# Patient Record
Sex: FEMALE | Race: WHITE | Employment: OTHER | URBAN - NONMETROPOLITAN AREA
[De-identification: names, ages, dates, MRNs, and addresses within clinical notes are randomized per-mention and may not be internally consistent; named-entity substitution may affect disease eponyms.]

---

## 2017-02-28 ENCOUNTER — HISTORY (OUTPATIENT)
Dept: SURGERY | Facility: OTHER | Age: 72
End: 2017-02-28

## 2017-06-26 ENCOUNTER — HISTORY (OUTPATIENT)
Dept: FAMILY MEDICINE | Facility: OTHER | Age: 72
End: 2017-06-26

## 2017-06-26 ENCOUNTER — OFFICE VISIT - GICH (OUTPATIENT)
Dept: FAMILY MEDICINE | Facility: OTHER | Age: 72
End: 2017-06-26

## 2017-06-26 DIAGNOSIS — M54.2 CERVICALGIA: ICD-10-CM

## 2017-06-26 DIAGNOSIS — S16.1XXA STRAIN OF MUSCLE, FASCIA AND TENDON AT NECK LEVEL, INITIAL ENCOUNTER: ICD-10-CM

## 2017-06-30 ENCOUNTER — HISTORY (OUTPATIENT)
Dept: FAMILY MEDICINE | Facility: OTHER | Age: 72
End: 2017-06-30

## 2017-06-30 ENCOUNTER — OFFICE VISIT - GICH (OUTPATIENT)
Dept: FAMILY MEDICINE | Facility: OTHER | Age: 72
End: 2017-06-30

## 2017-06-30 DIAGNOSIS — E89.0 POSTPROCEDURAL HYPOTHYROIDISM: ICD-10-CM

## 2017-06-30 DIAGNOSIS — Z87.39 PERSONAL HISTORY OF OTHER DISEASES OF THE MUSCULOSKELETAL SYSTEM AND CONNECTIVE TISSUE: ICD-10-CM

## 2017-06-30 DIAGNOSIS — Z23 ENCOUNTER FOR IMMUNIZATION: ICD-10-CM

## 2017-06-30 DIAGNOSIS — R53.83 OTHER FATIGUE: ICD-10-CM

## 2017-06-30 LAB
ABSOLUTE BASOPHILS - HISTORICAL: 0 THOU/CU MM
ABSOLUTE EOSINOPHILS - HISTORICAL: 0.2 THOU/CU MM
ABSOLUTE IMMATURE GRANULOCYTES(METAS,MYELOS,PROS) - HISTORICAL: 0 THOU/CU MM
ABSOLUTE LYMPHOCYTES - HISTORICAL: 2.5 THOU/CU MM (ref 0.9–2.9)
ABSOLUTE MONOCYTES - HISTORICAL: 0.5 THOU/CU MM
ABSOLUTE NEUTROPHILS - HISTORICAL: 4.3 THOU/CU MM (ref 1.7–7)
BASOPHILS # BLD AUTO: 0.4 %
EOSINOPHIL NFR BLD AUTO: 2.4 %
ERYTHROCYTE [DISTWIDTH] IN BLOOD BY AUTOMATED COUNT: 12.4 % (ref 11.5–15.5)
HCT VFR BLD AUTO: 40.7 % (ref 33–51)
HEMOGLOBIN: 13.6 G/DL (ref 12–16)
IMMATURE GRANULOCYTES(METAS,MYELOS,PROS) - HISTORICAL: 0.3 %
LYMPHOCYTES NFR BLD AUTO: 33.3 % (ref 20–44)
MCH RBC QN AUTO: 29.5 PG (ref 26–34)
MCHC RBC AUTO-ENTMCNC: 33.4 G/DL (ref 32–36)
MCV RBC AUTO: 88 FL (ref 80–100)
MONOCYTES NFR BLD AUTO: 6 %
NEUTROPHILS NFR BLD AUTO: 57.6 % (ref 42–72)
PLATELET # BLD AUTO: 202 THOU/CU MM (ref 140–440)
PMV BLD: 11.4 FL (ref 6.5–11)
RED BLOOD COUNT - HISTORICAL: 4.61 MIL/CU MM (ref 4–5.2)
T4 TOTAL: 8.57 UG/DL (ref 6.09–12.23)
TSH - HISTORICAL: 1.4 UIU/ML (ref 0.34–5.6)
WHITE BLOOD COUNT - HISTORICAL: 7.5 THOU/CU MM (ref 4.5–11)

## 2017-07-05 ENCOUNTER — AMBULATORY - GICH (OUTPATIENT)
Dept: SCHEDULING | Facility: OTHER | Age: 72
End: 2017-07-05

## 2017-08-08 ENCOUNTER — HOSPITAL ENCOUNTER (OUTPATIENT)
Dept: RADIOLOGY | Facility: OTHER | Age: 72
End: 2017-08-08
Attending: FAMILY MEDICINE

## 2017-08-08 ENCOUNTER — HISTORY (OUTPATIENT)
Dept: FAMILY MEDICINE | Facility: OTHER | Age: 72
End: 2017-08-08

## 2017-08-08 ENCOUNTER — OFFICE VISIT - GICH (OUTPATIENT)
Dept: FAMILY MEDICINE | Facility: OTHER | Age: 72
End: 2017-08-08

## 2017-08-08 DIAGNOSIS — M25.552 PAIN IN LEFT HIP: ICD-10-CM

## 2017-08-08 DIAGNOSIS — R19.5 OTHER FECAL ABNORMALITIES: ICD-10-CM

## 2017-08-09 ENCOUNTER — AMBULATORY - GICH (OUTPATIENT)
Dept: LAB | Facility: OTHER | Age: 72
End: 2017-08-09

## 2017-08-09 ENCOUNTER — COMMUNICATION - GICH (OUTPATIENT)
Dept: SURGERY | Facility: OTHER | Age: 72
End: 2017-08-09

## 2017-08-09 DIAGNOSIS — R19.5 OTHER FECAL ABNORMALITIES: ICD-10-CM

## 2017-08-10 ENCOUNTER — COMMUNICATION - GICH (OUTPATIENT)
Dept: SURGERY | Facility: OTHER | Age: 72
End: 2017-08-10

## 2017-08-10 DIAGNOSIS — Z12.11 ENCOUNTER FOR SCREENING FOR MALIGNANT NEOPLASM OF COLON: ICD-10-CM

## 2017-08-10 LAB
METHOD O&P - HISTORICAL: NORMAL
OVA/PARASITE EXAM - HISTORICAL: NORMAL

## 2017-08-11 ENCOUNTER — COMMUNICATION - GICH (OUTPATIENT)
Dept: FAMILY MEDICINE | Facility: OTHER | Age: 72
End: 2017-08-11

## 2017-08-29 ENCOUNTER — HOSPITAL ENCOUNTER (OUTPATIENT)
Dept: SURGERY | Facility: OTHER | Age: 72
Discharge: HOME | End: 2017-08-29
Attending: SURGERY | Admitting: SURGERY

## 2017-08-29 ENCOUNTER — SURGERY (OUTPATIENT)
Dept: SURGERY | Facility: OTHER | Age: 72
End: 2017-08-29

## 2017-08-29 ENCOUNTER — TRANSFERRED RECORDS (OUTPATIENT)
Dept: MULTI SPECIALTY CLINIC | Facility: CLINIC | Age: 72
End: 2017-08-29

## 2017-08-29 ENCOUNTER — HISTORY (OUTPATIENT)
Dept: SURGERY | Facility: OTHER | Age: 72
End: 2017-08-29

## 2017-08-29 DIAGNOSIS — R19.5 OTHER FECAL ABNORMALITIES: ICD-10-CM

## 2017-12-27 NOTE — PROGRESS NOTES
Patient Information     Patient Name MRN Sex Fany Trujillo [id number] Female 1945      Progress Notes by Marialuisa Carrizales DO at 2017 12:17 PM     Author:  Marialuisa Carrizales DO Service:  (none) Author Type:  PHYS- Osteopathic     Filed:  2017 12:18 PM Date of Service:  2017 12:17 PM Status:  Signed     :  Marialuisa Carrizales DO (PHYS- Osteopathic)            Colonoscopy REPORT  ?  PRE-OPERATIVE DIAGNOSIS:  Abnormal bowel movement   POST-OPERATIVE DIAGNOSIS: normal colon     PROCEDURES: Colonoscopy + biopsy taken   SURGEON: AFUA Carrizales DO    ESTIMATED BLOOD LOSS: None     COMPLICATIONS: The patient tolerated the procedure well without complication.     INDICATIONS:   Fany Healy  Is seen at the request of their Elizabeth Haider MD, and is here today for a colonoscopy for     ICD-10-CM    1. Abnormal findings in stool R19.5 sodium chloride 0.9% 5 mL syringe (NORMAL SALINE)      lidocaine (1%) injection 0.1-1 mL      lactated Ringers infusion    . Informed consent was obtained, explaining the risks and benefits of the procedure, including but not limited to bleeding, infection, perforation, aspiration, complications from the anesthesia.   ?  DESCRIPTION OF PROCEDURE:   The patient was brought to the endoscopy suite and placed in left lateral decubitus position. Anesthesia was administered per the Department of Anesthesia, with constant monitoring of all vital signs. Digital rectal exam was performed prior to beginning the procedure and revealed no anal or rectal pathology. The previously lubricated Olympus was inserted in the rectum and insufflation was begun. The scope was passed up through the recto-sigmoid valves, through the sigmoid and transverse, down the ascending and the cecum was achieved at 90 cm. Good prep was noted. The scope was then withdrawn. There were no polyps, AVM,s or diverticula noted. Biopsy taken from 80/50/rectum.  The patient tolerated the procedure without  complicated and transferred to the recovery room in stable condition. Colonoscopy should be repeated in 10 years  time.   DO Nika

## 2017-12-27 NOTE — PROGRESS NOTES
Patient Information     Patient Name MRN Fany Reagan [id number] Female 1945      Progress Notes by Elizabeth Haider MD at 2017  3:30 PM     Author:  Elizabeth Haider MD Service:  (none) Author Type:  Physician     Filed:  2017  6:07 PM Encounter Date:  2017 Status:  Signed     :  Elizabeth Haider MD (Physician)            Nursing Notes:   Cynthia Larose  2017  4:29 PM  Signed  Patient presents for follow up rapid clinic Pain in the neck, Shoulders, and back.  Rapid clinic thought this was due to mowing and she has been icing heating and resting and states she is feeling better.   Cynthia Larose LPN........................2017  3:48 PM         Subjective:  Fany Healy is a 71 y.o. female who presents for follow up multiple issues     S/P thyroidectomy  patient was seen at the [facility] this year. Labs at that time to include thyroid testing. She is status post partial thyroidectomy having half of her thyroid removed. She gets a TSH and T4 annually. She notes that she has increased fatigue. She's been doing a lot of work at home.       Grief  States that when she returned home to see her after her second son passed away last year was very difficult for her. Her , and two sons  within 9 years. She states that she felt very depressed in April may but now that the warmer weather has come she likes being outside. She spends 10-50 minutes on a personal information.  She has several friends who support her;  Sister in area.     PHQ Depression Screen  Date of PHQ exam: 17  Over the last 2 weeks, how often have you been bothered by any of the following problems?  1. Little interest or pleasure in doing things: 0 - Not at all  2. Feeling down, depressed, or hopeless: 0 - Not at all                                  History of neck pain  patient reports having a history of upper back bilateral shoulder and neck pain. When she made  the appointment that's with the appointment was for. She was out mowing lawn on the incline. She was seen in the emergency room. She was using ice and heat and Tylenol and symptoms have improved. She is not mowing that portion of her lawn. Wondering what she can do to keep her neck shoulders and upper back in good health. She denies any numbness and tingling into her hands. No changes in bowel or bladder    HCM   patient is in need of Prevnar 13. She did have mammogram at the [facility] April of this year.       No Known Allergies  Current Outpatient Prescriptions on File Prior to Visit       Medication  Sig Dispense Refill     amLODIPine (NORVASC) 5 mg tablet 1 tablet in the am  0     Biotin 1 mg tablet Take  by mouth.  0     Cholecalciferol, Vitamin D3, 2,000 unit tablet Take 1 tablet by mouth once daily.  0     famotidine (PEPCID) 20 mg tablet 1 daily  0     melatonin 3 mg tablet Take 1 tablet by mouth at bedtime.  0     omega-3 fatty acids-vitamin E (FISH OIL) 1,000 mg cap Take  by mouth.  0     Omeprazole 20 mg tablet Take 1 tablet by mouth once daily.  0     Psyllium Husk-Sucrose (METAMUCIL) 3.4 gram/12 gram powd 1 tablespoon twice daily as needed  0     No current facility-administered medications on file prior to visit.      Patient Active Problem List     Diagnosis  Code     Hallux valgus (acquired) bilateral M20.10     PRONATION FOOT OR ANKLE, ACQUIRED M21.869, M21.6X9     HYPERTENSION I10     GERD K21.9     PLANTAR FASCIITIS, BILATERAL M72.2     OSTEOARTHRITIS, KNEES, BILATERAL M17.10     DEPRESSION, SITUATIONAL F43.21     BACK PAIN, MECHANICAL M54.9     ARTHRITIS, RIGHT HIP M12.9     VERTIGO R42              Social Hx:  Social History        Substance Use Topics          Smoking status:   Former Smoker      Quit date:  11/19/1984      Smokeless tobacco:   Never Used      Alcohol use   3.0 oz/week     5 Standard drinks or equivalent per week        Comment: Occasional glass of wine       Social History  Narrative    She is .  With  on weekends.    [place] in [place].      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          Preload 7/31/2013                Family Hx:   Family History       Problem   Relation Age of Onset     Other  Mother      Alzheimer's       Heart Disease  Mother      aortic stenosis       Osteoporosis  Mother      Cancer-breast  Mother      Heart Disease  Father 84     CHF       Stroke  Sister 53     CVA         Objective:  /78  Pulse 72  Wt 87.1 kg (192 lb)  Breastfeeding? No  BMI 32.45 kg/m2   Patient appears well, alert and oriented x 3, pleasant, cooperative.  ALBA. TM's clear, Oral pharynx with good dentition, without lesion, erythema or exudate. Moist mucous membranes. Neck supple and free of adenopathy, or masses. Full ROM ;   Minimal myofascial trigger points noted  No thyromegaly. Lungs are clear, without wheezes, rhonchi or rales. Heart sounds are normal, no murmurs, clicks, gallops or rubs. Abdomen is soft, no tenderness, masses or organomegaly. No CVAT.  Extremities are without edema. Peripheral pulses are normal. Screening neurological exam is normal without focal findings. Skin is tanned.  Dry, warm without rash.       Results for orders placed or performed in visit on 06/30/17      TSH      Result  Value Ref Range    TSH 1.40 0.34 - 5.60 uIU/mL   T4 (THYROXINE)      Result  Value Ref Range    T4 (THYROXINE) 8.57 6.09 - 12.23 ug/dL   CBC WITH AUTO DIFFERENTIAL      Result  Value Ref Range    WHITE BLOOD COUNT         7.5 4.5 - 11.0 thou/cu mm    RED BLOOD COUNT           4.61 4.00 - 5.20 mil/cu mm    HEMOGLOBIN                13.6 12.0 - 16.0 g/dL    HEMATOCRIT                40.7 33.0 - 51.0 %    MCV                       88 80 - 100 fL    MCH                       29.5 26.0 - 34.0 pg    MCHC                      33.4 32.0 - 36.0 g/dL    RDW                       12.4 11.5 - 15.5 %    PLATELET COUNT            202 140 - 440 thou/cu mm    MPV                        11.4 (H) 6.5 - 11.0 fL    NEUTROPHILS               57.6 42.0 - 72.0 %    LYMPHOCYTES               33.3 20.0 - 44.0 %    MONOCYTES                 6.0 <12.0 %    EOSINOPHILS               2.4 <8.0 %    BASOPHILS                 0.4 <3.0 %    IMMATURE GRANULOCYTES(METAS,MYELOS,PROS) 0.3 %    ABSOLUTE NEUTROPHILS      4.3 1.7 - 7.0 thou/cu mm    ABSOLUTE LYMPHOCYTES      2.5 0.9 - 2.9 thou/cu mm    ABSOLUTE MONOCYTES        0.5 <0.9 thou/cu mm    ABSOLUTE EOSINOPHILS      0.2 <0.5 thou/cu mm    ABSOLUTE BASOPHILS        0.0 <0.3 thou/cu mm    ABSOLUTE IMMATURE GRANULOCYTES(METAS,MYELOS,PROS) 0.0 <=0.3 thou/cu mm       Assessment:    ICD-10-CM    1. Need for Streptococcus pneumoniae vaccination Z23 PREVNAR 13 (AKA PNEUMOCOCCAL VACCINE 13-VALENT IM)      CA ADMIN VACC INITIAL   2. S/P thyroidectomy E89.0 TSH      T4 (THYROXINE)      TSH      T4 (THYROXINE)   3. History of neck pain Z87.39    4. Fatigue, unspecified type R53.83 CBC WITH DIFFERENTIAL      CBC WITH DIFFERENTIAL      CBC WITH AUTO DIFFERENTIAL      Ms. Healy's Body mass index is 32.45 kg/(m^2). This is out of the normal range for a 71 y.o. Normal range for ages 18+ is between 18.5 and 24.9. To lose weight we reviewed risks and benefits of appropriate options such as diet, exercise, and medications. Patient's strategy will be  self-directed nutrition plan and self-directed exercise program    No need for thyroid replacement   Exam shows no specific findings to suggest a clear cause. No fever, no lymphadenopathy. Normal heart, lungs, abdomen. Patient is reassured that fatigue is common and does not always represent an active disease process. It may be related to stress, depression, overwork, being overweight, not getting enough exercise or not getting enough sleep. I have suggested observation for worsening or other new symptoms such as pain, fever or weight loss, running a few tests, as ordered, considering a low dose SSRI  antidepressant medication as treatment for nonspecific fatigue, reducing weight with a more healthy, balanced diet, reducing stress where possible and getting more rest and sleep. She will follow up if symptoms persist or worsen.    Plan:   -- Expected clinical course discussed   -- Medications and their side effects discussed  Patient Instructions   1.   Neck exercises as noted below for neck  2.  Consider  solanpas patches;  aspercreme with lidocaine or IcyHot with lidocaine   3.  prevnar vaccine provided   4.  TSH / T4 - thyroid tests  5.  Cbc pending   6.  Due for mammogram in April 2018  7. Handouts on hypothyroidism, neck strain exercises and grief provided to patient      Electronically signed by Elizabeth Haider MD

## 2017-12-28 NOTE — TELEPHONE ENCOUNTER
Patient Information     Patient Name MRFany Reyes [id number] Female 1945      Telephone Encounter by Samaria East at 8/10/2017  2:13 PM     Author:  Samaria East Service:  (none) Author Type:  (none)     Filed:  8/10/2017  2:18 PM Encounter Date:  8/10/2017 Status:  Signed     :  Samaria East            Screening Questions for the Scheduling of Screening Colonoscopies   (If Colonoscopy is diagnostic, Provider should review the chart before scheduling.)  Are you younger than 50 or older than 80?  NO   Do you take aspirin or fish oil?  BOTH  (if yes, tell patient to stop 1 week prior to Colonoscopy)  Do you take warfarin (Coumadin), clopidogrel (Plavix), apixaban (Eliquis), dabigatram (Pradaxa), rivaroxaban (Xarelto) or any blood thinner? NO   Do you use oxygen at home?  NO   Do you have kidney disease? NO   Are you on dialysis? NO   Have you had a stroke or heart attack in the last year? NO   Have you had a stent in your heart or any blood vessel in the last year? NO   Have you had a transplant of any organ? NO   Have you had a colonoscopy or upper endoscopy (EGD) before? YES          When?   -  [facility]    Date of scheduled Colonoscopy. 2017  Provider [facility]   Rohan KAUR

## 2017-12-28 NOTE — OR POSTOP
Patient Information     Patient Name MRN Sex Fany Trujillo [id number] Female 1945      OR PostOp by Cayla Avery RN at 2017  1:11 PM     Author:  Cayla Avery RN Service:  (none) Author Type:  NURS- Registered Nurse     Filed:  2017  1:12 PM Date of Service:  2017  1:11 PM Status:  Signed     :  Cayla Avery RN (NURS- Registered Nurse)            Discharge Note    Data:  Fany Healy has been discharged home at 1300 via ambulatory accompanied by Registered Nurse.      Action:  Written discharge/follow-up instructions were provided to patient. Prescriptions : None.  Belongings sent with patient. Medications from home sent with patient/family: Not Applicable  Equipment none .     Response:  Patient verbalized understanding of discharge instructions, reason for discharge, and necessary follow-up appointments.

## 2017-12-28 NOTE — TELEPHONE ENCOUNTER
Patient Information     Patient Name MRN Fany Reagan [id number] Female 1945      Telephone Encounter by Chelsea Sweeney MD at 2017 10:58 AM     Author:  Chelsea Sweeney MD Service:  (none) Author Type:  Physician     Filed:  2017 10:58 AM Encounter Date:  2017 Status:  Signed     :  Chelsea Sweeney MD (Physician)            Ok to schedule. Thanks! Chelsea Sweeney MD ....................  2017   10:58 AM

## 2017-12-28 NOTE — PROCEDURES
Patient Information     Patient Name MRN Sex Fany Trujillo [id number] Female 1945      Procedures by Marialuisa Carrizales DO at 2017  3:17 PM     Author:  Marialuisa Carrizales DO Service:  (none) Author Type:  PHYS- Osteopathic     Filed:  2017  3:20 PM Date of Service:  2017  3:17 PM Status:  Signed     :  Marialuisa Carrizales DO (PHYS- Osteopathic)        Procedures:    1. COLONOSCOPY [IRY222 (Custom)]               Colonoscopy REPORT  ?  PRE-OPERATIVE DIAGNOSIS: change in bowel habits   POST-OPERATIVE DIAGNOSIS:     PROCEDURES: Colonoscopy  + biopsy   SURGEON: AFUA Carrizales DO    ESTIMATED BLOOD LOSS: None     COMPLICATIONS: The patient tolerated the procedure well without complication.     INDICATIONS:   Fany Healy  Is seen at the request of their Elizabeth Haider MD, and is here today for a colonoscopy for     ICD-10-CM    1. Abnormal findings in stool R19.5 lidocaine (1%) injection 0.1-1 mL      DISCONTINUED: sodium chloride 0.9% 5 mL syringe (NORMAL SALINE)      DISCONTINUED: lactated Ringers infusion    . Informed consent was obtained, explaining the risks and benefits of the procedure, including but not limited to bleeding, infection, perforation, aspiration, complications from the anesthesia.   ?  DESCRIPTION OF PROCEDURE:   The patient was brought to the endoscopy suite and placed in left lateral decubitus position. Anesthesia was administered per the Department of Anesthesia, with constant monitoring of all vital signs. Digital rectal exam was performed prior to beginning the procedure and revealed no anal or rectal pathology. The previously lubricated Olympus was inserted in the rectum and insufflation was begun. The scope was passed up through the recto-sigmoid valves, through the sigmoid and transverse, down the ascending and the cecum was achieved at 90 cm. Good prep was noted. The scope was then withdrawn. There were no polyps, AVM,s or diverticula noted.   Biopsy were  taken at 80/40/retum The patient tolerated the procedure without complicated and transferred to the recovery room in stable condition. Patient does not require any further colonoscopy's secondary to age.  Rn will call w/ pathology reports.    DO Nika

## 2017-12-28 NOTE — PATIENT INSTRUCTIONS
Patient Information     Patient Name MRN Fany Reagan [id number] Female 1945      Patient Instructions by Elizabeth Haider MD at 2017  3:26 PM     Author:  Elizabeth Haider MD  Service:  (none) Author Type:  Physician     Filed:  2017  3:27 PM  Encounter Date:  2017 Status:  Addendum     :  Elizabeth Haider MD (Physician)        Related Notes: Original Note by Elizabeth Haider MD (Physician) filed at 2017  3:26 PM            Sulindac is your anti-inflammation medication. You cannot use ibuprofen or aleve over the counter with this medication.  Sulindac can upset your stomach, please take with food.  If it causes significant stomach upset, indigestion then discontinue.     Ice, ice, ice, ice    Worsening symptoms prompt Ortho eval, need for steroid injection     Index Exercises   Hip Bursitis   ________________________________________________________________________  KEY POINTS    Bursitis is irritation and swelling a fluid-filled sac that acts as a cushion between bone and other tissues, such as skin, muscle, tendons, or ligaments. There are several bursae in your hip.    You need to change or stop doing the activities that cause pain until the bursa has healed. Treatment may include removing fluid from the swollen area with a needle and syringe, medicine, or surgery.    To help prevent bursitis, wear protective pads in sports that may cause hits or falls on your hip. If your legs are different lengths, ask your provider if you should get lifts for your shoes.  ________________________________________________________________________  What is hip bursitis?  Bursitis is irritation and swelling of a bursa. A bursa is a fluid-filled sac that acts as a cushion between bone and other tissues, such as skin, muscle, tendons, or ligaments. Tendons are strong bands of tissue that attach muscle to bone. Ligaments are strong bands of tissue that  connect one bone to another to form the joints.  There are several bursae in your hip. A bursa that is only mildly irritated may improve within a few weeks with treatment. A bursa that is very swollen and irritated, or has been painful for a long time, may take months to improve.  What is the cause?   Common causes of hip bursitis include:    Injury to your hip, for example, from a fall or being tackled    Overuse injuries of your hip during sports, work, or hobbies. For example, a strong band of tissue called the iliotibial band goes down the outside of your thigh and connects your hip to your knee. Tight muscles in your hips or outer thigh can tighten this band and cause it to rub against the bursa. This is more likely to happen if you run, walk, or ride a bicycle a lot, especially if you increase your mileage too fast.    Infection from a cut or a scrape on the skin over a bursa  Other possible causes or risk factors for developing hip bursitis include:    Differences in the length of your legs or frequently running on uneven surfaces    Arthritis, which is pain and swelling of a joint, such as the joints in your lower back    Gout or pseudogout, which is pain and swelling caused by a buildup of crystals in your joints and under your skin    An autoimmune disease, such as rheumatoid arthritis or lupus, which causes your body to mistakenly attack your own tissue  What are the symptoms?   Symptoms may include:    Pain on the upper outer area of your thigh or on the side of your hip    Pain that gets worse when you walk, bicycle, go up or down stairs, or put pressure on the side of your hip (this pain can make sleeping on your side difficult)    Stiffness or trouble moving your hip  How is it diagnosed?   Your healthcare provider will ask about your symptoms and medical history and examine you. Tests may include:    An ultrasound, which uses sound waves to show pictures of the joint    Joint aspiration, which uses a  needle and syringe to remove a sample of fluid from the bursa. The fluid can be checked for infection and other causes of the bursitis. Removing some of the fluid can also help the pain.  You may have tests or scans to check for other possible causes of your symptoms, such as blood tests, X-rays, or an MRI, which uses a strong magnetic field and radio waves to show detailed pictures of the joint  How is it treated?   To relieve symptoms of bursitis:    You may need to change or stop doing the activities that cause pain until the bursa has healed. Do not put any pressure on the sore area while it is swollen. For example, you may need to swim instead of running or bicycling. If you are a cyclist, you may need to lower your bicycle seat.    Put an ice pack, gel pack, or package of frozen vegetables wrapped in a cloth on the area every 3 to 4 hours for up to 20 minutes at a time to help relieve pain.    Take nonprescription pain medicine, such as acetaminophen, ibuprofen, or naproxen. Read the label and take as directed. Unless recommended by your healthcare provider, you should not take these medicines for more than 10 days.    Nonsteroidal anti-inflammatory medicines (NSAIDs), such as ibuprofen, naproxen, and aspirin, may cause stomach bleeding and other problems. These risks increase with age.    Acetaminophen may cause liver damage or other problems. Unless recommended by your provider, don't take more than 3000 milligrams (mg) in 24 hours. To make sure you don t take too much, check other medicines you take to see if they also contain acetaminophen. Ask your provider if you need to avoid drinking alcohol while taking this medicine.    Follow your provider s instructions for doing exercises to keep your range of motion, keep the joint from getting stiff, and help the joint get stronger.    Bursitis caused by an infection may be treated with antibiotics.  If you keep having symptoms:    Your healthcare provider may  remove fluid from the swollen area with a needle and syringe.    Your provider may inject the inflamed area with a steroid medicine and a local anesthetic so you will have less swelling, redness, and pain.    Your provider may recommend surgery to take out the bursa if the joint keeps bothering you even after treatment.  Follow the full course of treatment prescribed by your healthcare provider. Ask your provider:    How and when you will get your test results    How long it will take to recover    If there are activities you should avoid and when you can return to your normal activities    How to take care of yourself at home    What symptoms or problems you should watch for and what to do if you have them  Make sure you know when you should come back for a checkup. Keep all appointments for provider visits or tests.  How can I help prevent hip bursitis?   It may help prevent hip bursitis if you warm up properly and do stretching exercises before sports or other physical activities. You may need to cut back on or avoid doing whatever seems to have caused the bursitis. Wear protective pads in sports that may cause hits or falls on your hip. If your legs are different lengths, ask your provider if you should get lifts for your shoes.  Developed by [employer].  Adult Advisor 2016.3 published by [employer].  Last modified: 2016-05-17  Last reviewed: 2014-10-06  This content is reviewed periodically and is subject to change as new health information becomes available. The information is intended to inform and educate and is not a replacement for medical evaluation, advice, diagnosis or treatment by a healthcare professional.  References   Adult Advisor 2016.3 Index    Copyright   2016 [employer], a division of [employer] All rights reserved.          Index [employer]   Hip (Trochanteric) Bursitis Exercises   Your healthcare provider may recommend exercises to help you heal. Talk to your healthcare  provider or physical therapist about which exercises will best help you and how to do them correctly and safely.  You can do the first 3 stretches to begin stretching the muscles that run along the outside of your hip. You can do the strengthening exercises when the sharp pain lessens.  Stretching exercises    Gluteal stretch: Lie on your back with both knees bent. Rest the ankle on your injured side over the knee of your other leg. Grasp the thigh of the leg on the uninjured side and pull toward your chest. You will feel a stretch along the buttocks on the injured side and possibly along the outside of your hip. Hold the stretch for 15 to 30 seconds. Repeat 3 times.    Iliotibial band stretch, standing: Cross your uninjured leg in front of the other leg and bend down and reach toward the inside of your back foot. Do not bend your knees. Hold this position for 15 to 30 seconds. Return to the starting position. Repeat 3 times.    Iliotibial band stretch, side-leaning: Stand sideways near a wall with your injured side closest to the wall. Place a hand on the wall for support. Cross the leg farther from the wall over the other leg. Keep the foot closest to the wall flat on the floor. Lean your hips into the wall. Hold the stretch for 15 to 30 seconds. Repeat 3 times.  Strengthening exercises    Straight leg raise: Lie on your back with your legs straight out in front of you. Bend the knee on your uninjured side and place the foot flat on the floor. Tighten the thigh muscle on your injured side and lift your leg about 8 inches off the floor. Keep your leg straight and your thigh muscle tight. Slowly lower your leg back down to the floor. Do 2 sets of 15.    Quadruped hip extension: Get onto your hands and knees. Draw your belly button in towards your spine and tighten your abdominal muscles. Lift your injured leg behind you and straighten your knee. Lower slowly. Do 2 sets of 15.    Side-lying leg lift: Lie on your  uninjured side. Tighten the front thigh muscles on your injured leg and lift that leg 8 to 10 inches (20 to 25 centimeters) away from the other leg. Keep the leg straight and lower it slowly. Do 2 sets of 15.    Wall squat with a ball: Stand with your back, shoulders, and head against a wall. Look straight ahead. Keep your shoulders relaxed and your feet 3 feet (90 centimeters) from the wall and shoulder's width apart. Place a soccer or basketball-sized ball behind your back. Keeping your back against the wall, slowly squat down to a 45-degree angle. Your thighs will not yet be parallel to the floor. Try to keep your knees aligned over your second toe as you bend them. Hold this position for 10 seconds and then slowly slide back up the wall. Repeat 10 times. Build up to 2 sets of 15.    Clam exercise: Lie on your uninjured side with your hips and knees bent and feet together. Slowly raise your top leg toward the ceiling while keeping your heels touching each other. Hold for 2 seconds and lower slowly. Do 2 sets of 15 repetitions.    Side plank: Lie on your side with your legs, hips, and shoulders in a straight line. Prop yourself up onto your forearm with your elbow directly under your shoulder. Lift your hips off the floor and balance on your forearm and the outside of your foot. Try to hold this position for 15 seconds and then slowly lower your hip to the ground. Switch sides and repeat. Work up to holding for 1 minute. This exercise can be made easier by starting with your knees and hips flexed toward your chest.    The plank: Lie on your stomach resting on our forearms. With your legs straight, lift your hips off the floor until they are in line with your shoulders. Support yourself on your forearms and toes. Hold this position for 15 seconds. (If this is too difficult, you can modify it by placing your knees on the floor.) Repeat 3 times. Work up to increasing your hold time to 30 to 60 seconds.  Developed by  [employer].  Adult Advisor 2016.3 published by [employer].  Last modified: 2015-01-12  Last reviewed: 2015-05-21  This content is reviewed periodically and is subject to change as new health information becomes available. The information is intended to inform and educate and is not a replacement for medical evaluation, advice, diagnosis or treatment by a healthcare professional.  References   Adult Advisor 2016.3 Index    Copyright   2016 [employer], a division of [employer] All rights reserved.

## 2017-12-28 NOTE — PROGRESS NOTES
"Patient Information     Patient Name MRN Fany Reagan [id number] Female 1945      Progress Notes by Elizabeth Haider MD at 2017  2:45 PM     Author:  Elizabeth Haider MD Service:  (none) Author Type:  Physician     Filed:  2017  2:12 PM Encounter Date:  2017 Status:  Signed     :  Elizabeth Haider MD (Physician)            Nursing Notes:   Agnes Lew  2017  3:17 PM  Signed  Patient presents to the clinic with request of hip xray. Recently had a hip replacement. Was getting into her brothers truck and couldn't lift herself up when her brother lifted her up herself. Getting more painful.   Agnes Lew ....................  2017   2:44 PM    Subjective:  Fany Healy is a 71 y.o. female who presents for  Left hip pain       Patient is 71 year old white female with history of right hip pain     Patient is a 71-year-old white female with history of right hip replacement now having left hip pain. She reports approximately 2-3 months ago she was getting into her brother-in-law's truck which did not have the step. She had to grab the top part of the truck and her brother-in-law shoved her up into the truck.  She caught her right knee and slammed into her left hip.  Right knee is feeling better but her left hip continues to have discomfort. Worse  when she sits for prolonged period time. She notes that she's been limping. She's been trying over-the-counter products without relief of symptoms. She is here for evaluation.  Her right hip is doing well. Denies any fevers or chills      Unusual stool  patient asks M.D. if I recall her having\" a worm in my poop\" last spring. At that time studies were negative she did not produce worm. She was never treated with an antihelminth . She reports twice now in [place] she had long stringy stool  Alongside firm stool.  She describes it almost like stool inside a brought casein but pencil thin. No blood " was associated. She measured it at times to one being 21 inches one being 24 inches. She tried to bring it to labs in the [place] area and they declined as she wrapped it in toilet paper.   She would like to have colonoscopy.   She is due this year.       Wt Readings from Last 3 Encounters:    08/08/17 86.3 kg (190 lb 3.2 oz)   06/30/17 87.1 kg (192 lb)   06/26/17 87.3 kg (192 lb 6.4 oz)          Allergies: reviewed in EMR  Medications: reviewed in EMR  Problem List/PMH: reviewed in EMR    Social Hx:  Social History        Substance Use Topics          Smoking status:   Former Smoker      Quit date:  11/19/1984      Smokeless tobacco:   Never Used      Alcohol use   3.0 oz/week     5 Standard drinks or equivalent per week        Comment: Occasional glass of wine       Social History Narrative    She is .  With  on weekends.    [place] in [place].      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          Preload 7/31/2013                Family Hx:   Family History       Problem   Relation Age of Onset     Other  Mother      Alzheimer's       Heart Disease  Mother      aortic stenosis       Osteoporosis  Mother      Cancer-breast  Mother      Heart Disease  Father 84     CHF       Stroke  Sister 53     CVA         Objective:  /66  Pulse 70  Temp 97.3  F (36.3  C) (Tympanic)   Resp 18  Wt 86.3 kg (190 lb 3.2 oz)  Breastfeeding? No  BMI 32.14 kg/m2   he is alert oriented ×3 she walks with slight limp favoring her left hip. She has good internal/external rotation of her right hip slightly decreased in her left hip compared to right. She is tender with palpation over the greater trochanter bursa. There is no ecchymosis or erythema or swelling. She is nontender with palpation over her left and right SI. Negative straight leg lift bilaterally. Ankle and knee reflexes are symmetric. Abdomen is obese soft nontender lungs are clear heart sounds regular         FINDINGS: Patient status  post right total hip arthroplasty.    There is moderate to severe joint space narrowing of the left hip. This has progressed in severity since the 2010 study.     IMPRESSION: Moderate to advanced left hip osteoarthritis with interval worsening of severity since the prior study.    Electronically Signed By: Johnnie Portillo M.D. on 8/8/2017 5:18 PM        Assessment:    ICD-10-CM    1. Left hip pain M25.552 XR HIP 2 OR 3 VIEWS W PELVIS LEFT      sulindac (CLINORIL) 200 mg tablet   2. Abnormal findings in stool R19.5 OVA / PARASITE EXAM      COLONOSCOPY SCREENING            Plan:   -- Expected clinical course discussed   -- Medications and their side effects discussed  Patient Instructions   Sulindac is your anti-inflammation medication. You cannot use ibuprofen or aleve over the counter with this medication.  Sulindac can upset your stomach, please take with food.  If it causes significant stomach upset, indigestion then discontinue.     Ice, ice, ice, ice    Worsening symptoms prompt Ortho eval, need for steroid injection     Index Exercises   Hip Bursitis   ________________________________________________________________________  KEY POINTS    Bursitis is irritation and swelling a fluid-filled sac that acts as a cushion between bone and other tissues, such as skin, muscle, tendons, or ligaments. There are several bursae in your hip.    You need to change or stop doing the activities that cause pain until the bursa has healed. Treatment may include removing fluid from the swollen area with a needle and syringe, medicine, or surgery.    To help prevent bursitis, wear protective pads in sports that may cause hits or falls on your hip. If your legs are different lengths, ask your provider if you should get lifts for your shoes.  ________________________________________________________________________  What is hip bursitis?  Bursitis is irritation and swelling of a bursa. A bursa is a fluid-filled sac that acts as a  cushion between bone and other tissues, such as skin, muscle, tendons, or ligaments. Tendons are strong bands of tissue that attach muscle to bone. Ligaments are strong bands of tissue that connect one bone to another to form the joints.  There are several bursae in your hip. A bursa that is only mildly irritated may improve within a few weeks with treatment. A bursa that is very swollen and irritated, or has been painful for a long time, may take months to improve.  What is the cause?   Common causes of hip bursitis include:    Injury to your hip, for example, from a fall or being tackled    Overuse injuries of your hip during sports, work, or hobbies. For example, a strong band of tissue called the iliotibial band goes down the outside of your thigh and connects your hip to your knee. Tight muscles in your hips or outer thigh can tighten this band and cause it to rub against the bursa. This is more likely to happen if you run, walk, or ride a bicycle a lot, especially if you increase your mileage too fast.    Infection from a cut or a scrape on the skin over a bursa  Other possible causes or risk factors for developing hip bursitis include:    Differences in the length of your legs or frequently running on uneven surfaces    Arthritis, which is pain and swelling of a joint, such as the joints in your lower back    Gout or pseudogout, which is pain and swelling caused by a buildup of crystals in your joints and under your skin    An autoimmune disease, such as rheumatoid arthritis or lupus, which causes your body to mistakenly attack your own tissue  What are the symptoms?   Symptoms may include:    Pain on the upper outer area of your thigh or on the side of your hip    Pain that gets worse when you walk, bicycle, go up or down stairs, or put pressure on the side of your hip (this pain can make sleeping on your side difficult)    Stiffness or trouble moving your hip  How is it diagnosed?   Your healthcare provider  will ask about your symptoms and medical history and examine you. Tests may include:    An ultrasound, which uses sound waves to show pictures of the joint    Joint aspiration, which uses a needle and syringe to remove a sample of fluid from the bursa. The fluid can be checked for infection and other causes of the bursitis. Removing some of the fluid can also help the pain.  You may have tests or scans to check for other possible causes of your symptoms, such as blood tests, X-rays, or an MRI, which uses a strong magnetic field and radio waves to show detailed pictures of the joint  How is it treated?   To relieve symptoms of bursitis:    You may need to change or stop doing the activities that cause pain until the bursa has healed. Do not put any pressure on the sore area while it is swollen. For example, you may need to swim instead of running or bicycling. If you are a cyclist, you may need to lower your bicycle seat.    Put an ice pack, gel pack, or package of frozen vegetables wrapped in a cloth on the area every 3 to 4 hours for up to 20 minutes at a time to help relieve pain.    Take nonprescription pain medicine, such as acetaminophen, ibuprofen, or naproxen. Read the label and take as directed. Unless recommended by your healthcare provider, you should not take these medicines for more than 10 days.    Nonsteroidal anti-inflammatory medicines (NSAIDs), such as ibuprofen, naproxen, and aspirin, may cause stomach bleeding and other problems. These risks increase with age.    Acetaminophen may cause liver damage or other problems. Unless recommended by your provider, don't take more than 3000 milligrams (mg) in 24 hours. To make sure you don t take too much, check other medicines you take to see if they also contain acetaminophen. Ask your provider if you need to avoid drinking alcohol while taking this medicine.    Follow your provider s instructions for doing exercises to keep your range of motion, keep the  joint from getting stiff, and help the joint get stronger.    Bursitis caused by an infection may be treated with antibiotics.  If you keep having symptoms:    Your healthcare provider may remove fluid from the swollen area with a needle and syringe.    Your provider may inject the inflamed area with a steroid medicine and a local anesthetic so you will have less swelling, redness, and pain.    Your provider may recommend surgery to take out the bursa if the joint keeps bothering you even after treatment.  Follow the full course of treatment prescribed by your healthcare provider. Ask your provider:    How and when you will get your test results    How long it will take to recover    If there are activities you should avoid and when you can return to your normal activities    How to take care of yourself at home    What symptoms or problems you should watch for and what to do if you have them  Make sure you know when you should come back for a checkup. Keep all appointments for provider visits or tests.  How can I help prevent hip bursitis?   It may help prevent hip bursitis if you warm up properly and do stretching exercises before sports or other physical activities. You may need to cut back on or avoid doing whatever seems to have caused the bursitis. Wear protective pads in sports that may cause hits or falls on your hip. If your legs are different lengths, ask your provider if you should get lifts for your shoes.  Developed by [employer].  Adult Advisor 2016.3 published by [employer].  Last modified: 2016-05-17  Last reviewed: 2014-10-06  This content is reviewed periodically and is subject to change as new health information becomes available. The information is intended to inform and educate and is not a replacement for medical evaluation, advice, diagnosis or treatment by a healthcare professional.  References   Adult Advisor 2016.3 Index    Copyright   2016 [employer], a division of [employer] All rights reserved.          Index Filipino   Hip (Trochanteric) Bursitis Exercises   Your healthcare provider may recommend exercises to help you heal. Talk to your healthcare provider or physical therapist about which exercises will best help you and how to do them correctly and safely.  You can do the first 3 stretches to begin stretching the muscles that run along the outside of your hip. You can do the strengthening exercises when the sharp pain lessens.  Stretching exercises    Gluteal stretch: Lie on your back with both knees bent. Rest the ankle on your injured side over the knee of your other leg. Grasp the thigh of the leg on the uninjured side and pull toward your chest. You will feel a stretch along the buttocks on the injured side and possibly along the outside of your hip. Hold the stretch for 15 to 30 seconds. Repeat 3 times.    Iliotibial band stretch, standing: Cross your uninjured leg in front of the other leg and bend down and reach toward the inside of your back foot. Do not bend your knees. Hold this position for 15 to 30 seconds. Return to the starting position. Repeat 3 times.    Iliotibial band stretch, side-leaning: Stand sideways near a wall with your injured side closest to the wall. Place a hand on the wall for support. Cross the leg farther from the wall over the other leg. Keep the foot closest to the wall flat on the floor. Lean your hips into the wall. Hold the stretch for 15 to 30 seconds. Repeat 3 times.  Strengthening exercises    Straight leg raise: Lie on your back with your legs straight out in front of you. Bend the knee on your uninjured side and place the foot flat on the floor. Tighten the thigh muscle on your injured side and lift your leg about 8 inches off the floor. Keep your leg straight and your thigh muscle tight. Slowly lower your leg back down to the floor. Do 2 sets of 15.    Quadruped hip extension: Get onto your hands and knees. Draw your belly button in  towards your spine and tighten your abdominal muscles. Lift your injured leg behind you and straighten your knee. Lower slowly. Do 2 sets of 15.    Side-lying leg lift: Lie on your uninjured side. Tighten the front thigh muscles on your injured leg and lift that leg 8 to 10 inches (20 to 25 centimeters) away from the other leg. Keep the leg straight and lower it slowly. Do 2 sets of 15.    Wall squat with a ball: Stand with your back, shoulders, and head against a wall. Look straight ahead. Keep your shoulders relaxed and your feet 3 feet (90 centimeters) from the wall and shoulder's width apart. Place a soccer or basketball-sized ball behind your back. Keeping your back against the wall, slowly squat down to a 45-degree angle. Your thighs will not yet be parallel to the floor. Try to keep your knees aligned over your second toe as you bend them. Hold this position for 10 seconds and then slowly slide back up the wall. Repeat 10 times. Build up to 2 sets of 15.    Clam exercise: Lie on your uninjured side with your hips and knees bent and feet together. Slowly raise your top leg toward the ceiling while keeping your heels touching each other. Hold for 2 seconds and lower slowly. Do 2 sets of 15 repetitions.    Side plank: Lie on your side with your legs, hips, and shoulders in a straight line. Prop yourself up onto your forearm with your elbow directly under your shoulder. Lift your hips off the floor and balance on your forearm and the outside of your foot. Try to hold this position for 15 seconds and then slowly lower your hip to the ground. Switch sides and repeat. Work up to holding for 1 minute. This exercise can be made easier by starting with your knees and hips flexed toward your chest.    The plank: Lie on your stomach resting on our forearms. With your legs straight, lift your hips off the floor until they are in line with your shoulders. Support yourself on your forearms and toes. Hold this position for  15 seconds. (If this is too difficult, you can modify it by placing your knees on the floor.) Repeat 3 times. Work up to increasing your hold time to 30 to 60 seconds.  Developed by [employer].  Adult Advisor 2016.3 published by [employer].  Last modified: 2015-01-12  Last reviewed: 2015-05-21  This content is reviewed periodically and is subject to change as new health information becomes available. The information is intended to inform and educate and is not a replacement for medical evaluation, advice, diagnosis or treatment by a healthcare professional.  References   Adult Advisor 2016.3 Index    Copyright   2016 [employer], a division of [employer] All rights reserved.             Electronically signed by Elizabeth Haider MD

## 2017-12-28 NOTE — TELEPHONE ENCOUNTER
Patient Information     Patient Name Fany Arboleda [id number] Female 1945      Telephone Encounter by Eunice Vanegas at 2017 11:53 AM     Author:  Eunice Vanegas Service:  (none) Author Type:  (none)     Filed:  2017 11:54 AM Encounter Date:  2017 Status:  Signed     :  Eunice Vanegas            Patient was notified-see result note  Eunice Vanegas LPN ....................  2017   11:54 AM

## 2017-12-28 NOTE — PATIENT INSTRUCTIONS
Patient Information     Patient Name MRN Fany Reagan [id number] Female 1945      Patient Instructions by Elizabeth Haider MD at 2017  4:42 PM     Author:  Elizabeth Haider MD  Service:  (none) Author Type:  Physician     Filed:  2017  6:01 PM  Encounter Date:  2017 Status:  Addendum     :  Elizabeth Haider MD (Physician)        Related Notes: Original Note by Elizabeth Haider MD (Physician) filed at 2017  4:42 PM            1.   Neck exercises as noted below for neck  2.  Consider  solanpas patches;  aspercreme with lidocaine or IcyHot with lidocaine   3.  prevnar vaccine provided   4.  TSH / T4 - thyroid tests  5.  Cbc pending   6.  Due for mammogram in 2018  7. Handouts on hypothyroidism, neck strain exercises and grief provided to patient

## 2017-12-28 NOTE — OR ANESTHESIA
Patient Information     Patient Name MRN Sex Fany Trujillo [id number] Female 1945      OR Anesthesia by John Carney CRNA at 2017 10:33 AM     Author:  John Carney CRNA Service:  (none) Author Type:  NURS- Nurse Anesthetist     Filed:  2017 10:33 AM Date of Service:  2017 10:33 AM Status:  Signed     :  John Carney CRNA (NURS- Nurse Anesthetist)                                                           ANESTHESIA ASSESSMENT    Date: 17 Time: 10:33 AM      Patient:  Fany Healy    Procedure(s) (LRB):  COLONOSCOPY (N/A)    Past Medical History:     Diagnosis  Date     ARTHRITIS, RIGHT HIP 2010     Chauhan's esophagus     Chauhan's change and chronic esophagitis noted on esophagogastroduodenoscopy .        Bunion      Concussion with no loss of consciousness 2011     Esophagitis     Chronic esophagitis consistent with reflux pattern.  No Chauhan's noted on esophagogastroduodenoscopy.      History of Clostridium difficile infection 10/10/06     Hx of pregnancy     G II, para 2.      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          Past Surgical History:      Procedure  Laterality Date     BUNIONECTOMY Left 2006     COLONOSCOPY DIAGNOSTIC  2007    F/U        ESOPHAGOGASTRODUODENOSCOPY  2006    Esophagogastroduodenoscopy, no evidence of Chauhan's esophagus.         ESOPHAGOGASTRODUODENOSCOPY  06/10/2005    EGD       FLEXIBLE SIGMOIDOSCOPY  2002    Flexible sigmoidoscopy and barium enema.        HIP REPLACEMENT Right 2016    Dr. Gurvinder Abraham  - [facility]        IR COLON W CONTRAST  2002     ORAL SURGERY      Six root canals.        PARTIAL THYROIDECTOMY      Partial thyroidectomy.       TUMOR REMOVAL  5/02/90    Tumor on chin removed at [facility].         Family History       Problem   Relation Age of Onset     Other  Mother      Alzheimer's       Heart Disease  Mother      aortic stenosis       Osteoporosis  Mother      Cancer-breast  Mother      Heart Disease  Father 84     CHF       Stroke  Sister 53     CVA         Patient Active Problem List     Diagnosis  Code     Hallux valgus (acquired) bilateral M20.10     PRONATION FOOT OR ANKLE, ACQUIRED M21.869, M21.6X9     HYPERTENSION I10     GERD K21.9     PLANTAR FASCIITIS, BILATERAL M72.2     OSTEOARTHRITIS, KNEES, BILATERAL M17.10     DEPRESSION, SITUATIONAL F43.21     BACK PAIN, MECHANICAL M54.9     VERTIGO R42       Prescriptions Prior to Admission       Medication  Sig Dispense Refill     amLODIPine (NORVASC) 5 mg tablet 1 tablet in the am  0     aspirin (ECOTRIN) 81 mg enteric coated tablet Take 1 tablet by mouth once daily with a meal.  0     Biotin 1 mg tablet Take  by mouth.  0     Cholecalciferol, Vitamin D3, 2,000 unit tablet Take 1 tablet by mouth once daily.  0     famotidine (PEPCID) 20 mg tablet 1 daily  0     melatonin 3 mg tablet Take 1 tablet by mouth at bedtime.  0     omega-3 fatty acids-vitamin E (FISH OIL) 1,000 mg cap Take  by mouth.  0     Omeprazole 20 mg tablet Take 1 tablet by mouth once daily.  0     Psyllium Husk-Sucrose (METAMUCIL) 3.4 gram/12 gram powd 1 tablespoon twice daily as needed  0       Allergies:  Allergies     Allergen  Reactions     Sulindac Dizziness       Review of Systems:  GERD: Yes (No present symptoms. )  Chest pain: No  Shortness of breath: No  Recent fever: No  Poor exercise tolerance: No  Bleeding tendency: No  Pregnant: No  Anesthesia Complications: None      History     Smoking Status       Former Smoker      Quit date: 11/19/1984   Smokeless Tobacco       Never Used      Social History     Social History        Marital  status:  Single     Spouse name: N/A     Number of children:  N/A     Years of education:  N/A     Social History Main Topics          Smoking status:   Former Smoker      Quit date:  11/19/1984      Smokeless tobacco:   Never Used      Alcohol use   3.0 oz/week     5 Standard drinks or equivalent per week        Comment: Occasional glass of wine       Drug use:   No      Sexual activity:   No      Other Topics   Concern      Service  No     Blood Transfusions  No     Caffeine Concern  No     Occupational Exposure  No     Hobby Hazards  No     Sleep Concern  No     Stress Concern  No     Weight Concern  Yes     Special Diet  Yes     trying       Back Care  Yes     some lower back pain at times      Exercise  Yes     Bike Helmet  Yes     na      Seat Belt  Yes     Self-Exams  Yes     Social History Narrative     She is .  With  on weekends.    [place] in [place].      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          Preload 7/31/2013               Physical Examination:  /80  Pulse 72  Temp 98.7  F (37.1  C)  Resp 16  SpO2 96%  Breastfeeding? No There is no height or weight on file to calculate BMI. There is no height or weight on file to calculate BSA.  Dental Condition: Good     Mallampati Score (Airway): II  Cardiovascular: Abnormal (HTN)  Pulmonary: Normal  Other: (not recorded)    Recent Labs in [facility]:    No results for input(s): SODIUM, POTASSIUM, CHLORIDE, LP0UCMPF, ANIONGAP, BUN, CREATININE, BUNCREARATIO, CALCIUM, GLUCOSE, GLUCOSEMETER, KETONES, MAGNESIUM, WBC, HGB, HCT, PLT, ABORH, RHTYPE, PREGURINE, BHCGQL, HCGBETAQUANT, INR in the last 72 hours.          Assessment/Plan:  ASA Class: II  Risk of dental injury discussed: Yes  NPO status confirmed: Yes  Anesthetic Plan: MAC  Risk/Benefit/Alt discussed: Yes  Questions answered: Yes  Emergency Case?: No  Labs/ECG/Radiology Reviewed?: Yes      H&P Reviewed.  Patient Examined.      Provider Electronic  Signature:  John Carney, CHANTEL

## 2017-12-28 NOTE — OR ANESTHESIA
Patient Information     Patient Name MRN Sex Fany Lentz [id number] Female 1945      OR Anesthesia by John Carney CRNA at 2017 11:56 AM     Author:  John Carney CRNA Service:  (none) Author Type:  NURS- Nurse Anesthetist     Filed:  2017 11:56 AM Date of Service:  2017 11:56 AM Status:  Signed     :  John Carney CRNA (NURS- Nurse Anesthetist)            Anesthesia Post Operative Care Note    Name: Fany Healy  MRN:   [id number]  :    1945       Procedure Done:  See Surgeon Note        Anesthesia Technique    Anesthetic Type:  MAC       MAC Type:  NC     Oral Trauma:  No    Intraoperative Course   Hemodynamics:  Stable    Ventilation Normal:  Yes Lung Sounds:  Normal      PACU Course    Airway Status:  Extubated     Nondepolarizer Used:       Reversed: N/A   Hemodynamics:  Stable      Hydration: Euvolemic   Temperature:  36.1 - 38.3      Mental Status:  Arousable   Pain Management:  Adequate   Regional Block:  No      Vital Signs:  Temp: 98.7  F (37.1  C)  Pulse: 72  BP: 145/80  Resp: 16  SpO2: 96 %                       Active Lines:  Patient Lines/Drains/Airways Status    Active Line     Name: Placement date: Placement time: Site: Days:    PERIPHERAL VAD Right Hand 22 17   1050   Hand   less than 1                Intake & Output:       Labs:  No results for input(s): HV7TVMAOORW, GJI7XGSIIITK, PHARTERIAL, GZQ1ICPBYTYE, P6XSMIHZUJAZ in the last 24 hours.    No results for input(s): MAGNESIUM in the last 24 hours.    No results for input(s): GLUCOSEMETER in the last 720 hours.        John Carney CRNA ....................  2017   11:56 AM

## 2017-12-28 NOTE — PROGRESS NOTES
Patient Information     Patient Name MRN Fany Reagan [id number] Female 1945      Progress Notes by Marialuisa Carrizales DO at 2017  9:38 AM     Author:  Marialuisa Carrizales DO Service:  (none) Author Type:  PHYS- Osteopathic     Filed:  2017  9:38 AM Date of Service:  2017  9:38 AM Status:  Signed     :  Marialuisa Carrizales DO (PHYS- Osteopathic)            Please let patient know that biopsy done were negative.  Does not require any further screening colonoscopy's.     Of course, if the patient should ever notice any pain or difficulty have a bowel movement, rectal bleeding or unexplained weight loss, the patient  should seek medical care.

## 2017-12-28 NOTE — PROGRESS NOTES
Patient Information     Patient Name MRN Fany Reagan [id number] Female 1945      Progress Notes by Cecy Burrell NP at 2017  4:00 PM     Author:  Cecy Burrell NP Service:  (none) Author Type:  PHYS- Nurse Practitioner     Filed:  2017  7:53 PM Encounter Date:  2017 Status:  Signed     :  Cecy Burrell NP (PHYS- Nurse Practitioner)            HPI:    Fany Healy is a 71 y.o. female who presents to clinic today for  pain.  Woke up with pain in left thumb 3 nights ago (Thursday night), achy pain, rubbed it and it resolved.  Bilateral neck and bilateral shoulder pain started 2 days ago (Saturday).  Pain then radiated down back.   Pain radiates and is intermittent.  Left side of neck is worst.  Pain is with turning neck, worse with turning to right.  Intermittent flushed feeling.  No known injury or trauma.  Denies chest pain, shortness of breath, or chest tightness.  No headaches.  No dizziness.  No weakness.  No numbness or tingling in upper extremities.  Mowed the lawn with both a rider and then a push mower for 2 hours on Thursday on a hill.  Feeling fatigued.  No known tick bites.   States she had a large red raised spot on right side of posterior neck about a week ago - she is insistant it was a spider bite.  Area was itchy.  No nausea or vomiting.  No chiropractor, no massage, no ice or heat.  No tylenol or ibuprofen.            Past Medical History:     Diagnosis  Date     Chauhan's esophagus     Chauhan's change and chronic esophagitis noted on esophagogastroduodenoscopy .        Bunion      Concussion with no loss of consciousness 2011     Esophagitis     Chronic esophagitis consistent with reflux pattern.  No Chauhan's noted on esophagogastroduodenoscopy.      History of Clostridium difficile infection 10/10/06     Hx of pregnancy     G II, para 2.      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        Past Surgical History:      Procedure  Laterality Date     BUNIONECTOMY Left 03/20/2006     COLONOSCOPY DIAGNOSTIC  12/14/2007    F/U 2017       ESOPHAGOGASTRODUODENOSCOPY  07/20/2006    Esophagogastroduodenoscopy, no evidence of Chauhan's esophagus.         ESOPHAGOGASTRODUODENOSCOPY  06/10/2005    EGD       FLEXIBLE SIGMOIDOSCOPY  08/16/2002    Flexible sigmoidoscopy and barium enema.        IR COLON W CONTRAST  08/23/2002     ORAL SURGERY      Six root canals.       PARTIAL THYROIDECTOMY      Partial thyroidectomy.       TUMOR REMOVAL  5/02/90    Tumor on chin removed at [facility].       Social History        Substance Use Topics          Smoking status:   Former Smoker      Quit date:  11/19/1984      Smokeless tobacco:   Never Used      Alcohol use   3.0 oz/week     5 Standard drinks or equivalent per week        Comment: Occasional glass of wine       Current Outpatient Prescriptions       Medication  Sig Dispense Refill     amLODIPine (NORVASC) 5 mg tablet 1 tablet in the am  0     aspirin enteric coated 81 mg tablet 2 daily  0     Biotin 1 mg tablet Take  by mouth.  0     Cholecalciferol, Vitamin D3, 2,000 unit tablet Take 1 tablet by mouth once daily.  0     famotidine (PEPCID) 20 mg tablet 1 daily  0     melatonin 3 mg tablet Take 1 tablet by mouth at bedtime.  0     omega-3 fatty acids-vitamin E (FISH OIL) 1,000 mg cap Take  by mouth.  0     Omeprazole 20 mg tablet Take 1 tablet by mouth once daily.  0     Psyllium Husk-Sucrose (METAMUCIL) 3.4 gram/12 gram powd 1 tablespoon twice daily as needed  0     No current facility-administered medications for this visit.      Medications have been reviewed by me and are current to the best of my knowledge  "and ability.    No Known Allergies    Past medical history, past surgical history, current medications and allergies reviewed and accurate to the best of my knowledge.        ROS:  Refer to HPI    /74  Pulse 72  Temp 99.2  F (37.3  C) (Tympanic)   Ht 1.638 m (5' 4.5\")  Wt 87.3 kg (192 lb 6.4 oz)  BMI 32.52 kg/m2    EXAM:  General Appearance: Well appearing adult female, appropriate appearance for age. No acute distress  Eyes: conjunctivae normal, no drainage, pupils equal  Neck: supple without adenopathy  Respiratory: normal chest wall and respirations.  Normal effort.  Clear to auscultation bilaterally, no wheezing, crackles or rhonchi.  No increased work of breathing.  No cough appreciated  Cardiac: RRR with no murmurs  Musculoskeletal:  Cervical spine without tenderness to palpation.  Patient with guarding and grimacing when turning neck to the right.   No difficulty with chin to chest.  Muscle tauntness and tightness of neck muscles noted bilaterally.  No tenderness to palpation over thoracic spine.  No tenderness to palpation over bilateral shoulders.  Normal ROM and movement of bilateral upper extremities without difficulty.  No joint swelling noted of upper extremities.   Normal gait.   Equal movement of bilateral lower extremities.    Dermatological: no rashes or lesions noted of exposed skin  Psychological: normal affect, alert and pleasant        ASSESSMENT/PLAN:    ICD-10-CM    1. Neck pain, musculoskeletal M54.2    2. Strain of neck muscle, initial encounter S16.1XXA          Likely muscle strain from mowing lawn on hills for 2 hours with push mower  Symptomatic treatment - alternate ice and heat, gentle stretching, Tylenol or ibuprofen PRN  Follow up if symptoms persist or worsen or concerns        Patient Instructions   Alternate ice and heat 15 minutes each 3 times per day    Gentle stretch neck as able    Follow up if symptoms persist or worsen            "

## 2017-12-28 NOTE — TELEPHONE ENCOUNTER
Patient Information     Patient Name MRN Fany Reagan [id number] Female 1945      Telephone Encounter by Samaria East at 2017 10:40 AM     Author:  Samaria East Service:  (none) Author Type:  (none)     Filed:  2017 10:44 AM Encounter Date:  2017 Status:  Signed     :  Samaria East            Patient referred by Dr. Haider for a Colonoscopy ,  Diagnosis is abnormal findings in stool.  Please advise.  Thank you.

## 2017-12-29 NOTE — H&P
"Patient Information     Patient Name MRN Fany Reagan [id number] Female 1945      H&P by Marialuisa Carrizales DO at 2017 11:15 AM     Author:  Marialuisa Carrizales DO Service:  (none) Author Type:  PHYS- Osteopathic     Filed:  2017 11:20 AM Date of Service:  2017 11:15 AM Status:  Signed     :  Marialuisa Carrizales DO (PHYS- Osteopathic)            Colonoscopy Consult     Fany Healy  is seen at the request of Elizabeth Haider MD  regarding colon cancer screening. Fany Healy  has had a colonoscopy before.  Normal per patient.   Denies problems with constipation, diarrhea. No pain or difficulty with bowel movements. Denies rectal bleeding. There is no family history of any colon cancer. Has not had any weight loss. Appetite is good. No heart, lung, or kidney problems. No heartburn or indigestion. No prior colo-rectal surgery. No prior MARK/ surgery . No problems with anesthesia in the past.  Occasionally  Abdominal pain.  Notices changesin her bowel habits.  Feels like feces is in a \"casing\".    Patient has a history of :  DM : no  CVA/MI: no  CPAP use: no  Blood thinners: asa  Kidney disease: no  Patient needs to be MAC because of: Medical conditions and airway control.    Prior to Admission medications          Medication Sig Start Date End Date Taking? Last Dose Authorizing Provider   amLODIPine (NORVASC) 5 mg tablet 1 tablet in the am 13 at 0600 Elizabeth Haider MD   aspirin (ECOTRIN) 81 mg enteric coated tablet Take 1 tablet by mouth once daily with a meal. 17 at 0800 Elizabeth Haider MD   Biotin 1 mg tablet Take  by mouth. 17 Cecy Burrell NP   Cholecalciferol, Vitamin D3, 2,000 unit tablet Take 1 tablet by mouth once daily. 17 Cecy Burrell NP   famotidine (PEPCID) 20 mg tablet 1 daily 13 Elizabeth Haider MD   melatonin 3 mg tablet Take 1 tablet by " mouth at bedtime. 5/6/15   8/28/2017 at 2200 Elizabeth Haider MD   omega-3 fatty acids-vitamin E (FISH OIL) 1,000 mg cap Take  by mouth. 2/26/13 8/22/2017 at 0800 Elizabeth Haider MD   Omeprazole 20 mg tablet Take 1 tablet by mouth once daily. 2/26/13 8/27/2017 Elizabeth Haider MD   Psyllium Husk-Sucrose (METAMUCIL) 3.4 gram/12 gram powd 1 tablespoon twice daily as needed 5/6/15   8/27/2017 Elizabeth Haider MD         Allergies     Allergen  Reactions     Sulindac Dizziness         Past Medical History:     Diagnosis  Date     ARTHRITIS, RIGHT HIP 7/8/2010     Chauhan's esophagus     Chauhan's change and chronic esophagitis noted on esophagogastroduodenoscopy 6/05.        Bunion      Concussion with no loss of consciousness 7/7/2011     Esophagitis     Chronic esophagitis consistent with reflux pattern.  No Chauhan's noted on esophagogastroduodenoscopy.      History of Clostridium difficile infection 10/10/06     Hx of pregnancy     G II, para 2.      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          Past Surgical History:      Procedure  Laterality Date     BUNIONECTOMY Left 03/20/2006     COLONOSCOPY DIAGNOSTIC  12/14/2007    F/U 2017       ESOPHAGOGASTRODUODENOSCOPY  07/20/2006    Esophagogastroduodenoscopy, no evidence of Chauhan's esophagus.         ESOPHAGOGASTRODUODENOSCOPY  06/10/2005    EGD       FLEXIBLE SIGMOIDOSCOPY  08/16/2002    Flexible sigmoidoscopy and barium enema.        HIP REPLACEMENT Right 01/06/2016    Dr. Gurvinder Abraham  - [facility]        IR COLON W CONTRAST  08/23/2002     ORAL SURGERY      Six root canals.       PARTIAL THYROIDECTOMY      Partial thyroidectomy.        TUMOR REMOVAL  90    Tumor on chin removed at [facility].         Social History     Social History        Marital status:  Single     Spouse name: N/A     Number of children:  N/A     Years of education:  N/A     Occupational History      Not on file.     Social History Main Topics          Smoking status:   Former Smoker      Quit date:  1984      Smokeless tobacco:   Never Used      Alcohol use   3.0 oz/week     5 Standard drinks or equivalent per week        Comment: Occasional glass of wine       Drug use:   No      Sexual activity:   No      Other Topics   Concern      Service  No     Blood Transfusions  No     Caffeine Concern  No     Occupational Exposure  No     Hobby Hazards  No     Sleep Concern  No     Stress Concern  No     Weight Concern  Yes     Special Diet  Yes     trying       Back Care  Yes     some lower back pain at times      Exercise  Yes     Bike Helmet  Yes     na      Seat Belt  Yes     Self-Exams  Yes     Social History Narrative     She is .  With  on weekends.    [place] in [place].      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          Preload 2013               Family History       Problem   Relation Age of Onset     Other  Mother      Alzheimer's       Heart Disease  Mother      aortic stenosis       Osteoporosis  Mother      Cancer-breast  Mother      Heart Disease  Father 84     CHF       Stroke  Sister 53     CVA           REVIEW OF SYSTEMS:      Four point ROS is done in HPI.  Pertinent positive and negatives are noted in HPI.                  PHYSICAL EXAM    /80  Pulse 72  Temp 98.7  F (37.1  C)  Resp 16  SpO2 96%  Breastfeeding? No  Temp (24hrs), Av.7  F (37.1  C), Min:98.7  F (37.1  C), Max:98.7  F (37.1  C)    No data found.     No intake or output data in the 24 hours ending 17 1116          GENERAL APPEARANCE: Alert, healthy appearance, oriented, in no acute distress  HYDRATION: Well  hydrated  HEAD, EYES, EARS, NECK, AND THROAT: Head is normocephalic, pupils equal, round, reactive to light and accommodation, ocular movement intact, sclera clear and no jaundice. Dentition intact.  No TMJ issues.  NECK: Supple, no lymphadenopathy, no restriction to rom.   LUNGS: normal respiration, clear to auscultation  HEART: Regular rate and rhythm, normal heart sounds,   EXTREMITY: No edema or cyanosis,   ABDOMEN: soft and non-tender today. No hernias.  NEURO: CN: Intact.       Plan:Colonscopy        The pt did  do a bowel prep and has only clear yellowish material at this time.  The patient understands the importance of hydration even after. The patient understands there is a theuraretical risk of renal failure from a bowel prep. She should still drink plenty of fluids for the next 24 hours. Plavix and coumadin will need to be held except in unusual circumstances. Patient's in atrill Fib do not need to be bridged with Lovenox or on CVA prophylaxis. A baby ASA can be continued but full dose ASA needs to be stopped for 10 days prior to the procedure.  NSAID's should be stopped 5 days before the procedure  A complete H & P is required within 30 days of the procedure. MAC is used for the colonoscopy.     Colonoscopy does not require antibiotics prophylaxis.    Risks: Informed consent is obtained for the procedural (explained in simple layman's terms that the pt and/or family could understand) explaining risks vs benefits and alternatives to the procedure and consequences if we do not do the procedure and need/rational for the procedure.    Risks include but are not limited to: bleeding, infection, perforation of colon. This would necessitate emergency surgery to repair the damage w/ possible ostomy; and other associated complications w/ the required surgery. Also complications of anesthesia including aspiration, MI/CVA/death.    I discussed with the patient would they could expect during the procedure, post  procedure and recovery time and risks. The patient understands that they need to have a ride home after the procedure. She understands that they should not return to work or do any strenuous activity for 24 hours after the procedure.   The patient was given all this information in writing and expressed understanding.    Thank you for allowing me to participate in the care of this Patient. A copy of the Endoscopy report will be forwarded to your office. If there are any questions or concerns please feel free to contact my office     Marialuisa Carrizales DO      CC:Elizabeth Haider MD

## 2017-12-29 NOTE — PATIENT INSTRUCTIONS
Patient Information     Patient Name MRN Sex Fany Trujillo [id number] Female 1945      Patient Instructions by Cecy Burrell NP at 2017  4:00 PM     Author:  Cecy Burrell NP Service:  (none) Author Type:  PHYS- Nurse Practitioner     Filed:  2017  5:03 PM Encounter Date:  2017 Status:  Signed     :  Cecy Burrell NP (PHYS- Nurse Practitioner)            Alternate ice and heat 15 minutes each 3 times per day    Gentle stretch neck as able    Follow up if symptoms persist or worsen

## 2017-12-30 NOTE — NURSING NOTE
Patient Information     Patient Name MRFany Reyes [id number] Female 1945      Nursing Note by Cynthia Larose at 2017  3:30 PM     Author:  Cynthia Larose Service:  (none) Author Type:  (none)     Filed:  2017  4:29 PM Encounter Date:  2017 Status:  Signed     :  Cynthia Larose            Patient presents for follow up rapid clinic Pain in the neck, Shoulders, and back.  Rapid clinic thought this was due to mowing and she has been icing heating and resting and states she is feeling better.   Cynthia Larose LPN........................2017  3:48 PM

## 2017-12-30 NOTE — NURSING NOTE
Patient Information     Patient Name MRFany Reyes [id number] Female 1945      Nursing Note by Agnes Lew at 2017  2:45 PM     Author:  Agnes Lew Service:  (none) Author Type:  NURS- Student Practical Nurse     Filed:  2017  3:17 PM Encounter Date:  2017 Status:  Signed     :  Agnes Lew (NURS- Student Practical Nurse)            Patient presents to the clinic with request of hip xray. Recently had a hip replacement. Was getting into her brothers truck and couldn't lift herself up when her brother lifted her up herself. Getting more painful.   Agnes Lew ....................  2017   2:44 PM

## 2017-12-30 NOTE — NURSING NOTE
Patient Information     Patient Name MRN Fany Reagan [id number] Female 1945      Nursing Note by Kiersten Zhao at 2017  4:00 PM     Author:  Kiersten Zhao Service:  (none) Author Type:  (none)     Filed:  2017  4:49 PM Encounter Date:  2017 Status:  Signed     :  Kiersten Zhao            Patient presents to the clinic for left thumb pain and shoulder/back pain x3 days.   Kiersten GRIDER, BLAYNE.......2017..4:09 PM

## 2018-01-27 VITALS
DIASTOLIC BLOOD PRESSURE: 74 MMHG | SYSTOLIC BLOOD PRESSURE: 138 MMHG | BODY MASS INDEX: 32.06 KG/M2 | WEIGHT: 192.4 LBS | HEIGHT: 65 IN | TEMPERATURE: 99.2 F | HEART RATE: 72 BPM

## 2018-01-27 VITALS
SYSTOLIC BLOOD PRESSURE: 128 MMHG | WEIGHT: 190.2 LBS | HEART RATE: 70 BPM | RESPIRATION RATE: 18 BRPM | TEMPERATURE: 97.3 F | DIASTOLIC BLOOD PRESSURE: 66 MMHG

## 2018-01-27 VITALS — HEART RATE: 72 BPM | WEIGHT: 192 LBS | SYSTOLIC BLOOD PRESSURE: 122 MMHG | DIASTOLIC BLOOD PRESSURE: 78 MMHG

## 2018-01-29 ENCOUNTER — DOCUMENTATION ONLY (OUTPATIENT)
Dept: FAMILY MEDICINE | Facility: OTHER | Age: 73
End: 2018-01-29

## 2018-01-29 PROBLEM — M54.9 BACKACHE: Status: ACTIVE | Noted: 2018-01-29

## 2018-01-29 PROBLEM — M72.2 PLANTAR FASCIITIS, BILATERAL: Status: ACTIVE | Noted: 2018-01-29

## 2018-01-29 PROBLEM — I10 HYPERTENSION: Status: ACTIVE | Noted: 2018-01-29

## 2018-01-29 PROBLEM — M21.969 ACQUIRED DEFORMITY OF ANKLE AND FOOT: Status: ACTIVE | Noted: 2018-01-29

## 2018-01-29 PROBLEM — F43.21 ADJUSTMENT DISORDER WITH DEPRESSED MOOD: Status: ACTIVE | Noted: 2018-01-29

## 2018-01-29 PROBLEM — K21.9 ESOPHAGEAL REFLUX: Status: ACTIVE | Noted: 2018-01-29

## 2018-01-29 RX ORDER — LANOLIN ALCOHOL/MO/W.PET/CERES
3 CREAM (GRAM) TOPICAL AT BEDTIME
COMMUNITY
Start: 2015-05-06

## 2018-01-29 RX ORDER — CHOLECALCIFEROL (VITAMIN D3) 50 MCG
2000 TABLET ORAL DAILY
COMMUNITY
Start: 2017-06-26

## 2018-01-29 RX ORDER — AMLODIPINE BESYLATE 5 MG/1
1 TABLET ORAL EVERY MORNING
COMMUNITY
Start: 2013-02-26 | End: 2020-07-09

## 2018-01-29 RX ORDER — ASPIRIN 81 MG/1
81 TABLET ORAL
COMMUNITY
Start: 2017-06-30

## 2018-01-29 RX ORDER — BIOTIN 1 MG
TABLET ORAL
COMMUNITY
Start: 2017-06-26 | End: 2020-05-18

## 2018-01-29 RX ORDER — NICOTINE POLACRILEX 4 MG/1
20 GUM, CHEWING ORAL 2 TIMES DAILY
COMMUNITY
Start: 2013-02-26 | End: 2020-07-09

## 2018-01-29 RX ORDER — CHLORAL HYDRATE 500 MG
1 CAPSULE ORAL 2 TIMES DAILY
COMMUNITY
Start: 2013-02-26

## 2018-01-29 RX ORDER — FAMOTIDINE 20 MG/1
TABLET, FILM COATED ORAL DAILY
COMMUNITY
Start: 2013-02-26 | End: 2020-07-01

## 2018-10-09 ENCOUNTER — OFFICE VISIT (OUTPATIENT)
Dept: FAMILY MEDICINE | Facility: OTHER | Age: 73
End: 2018-10-09
Attending: FAMILY MEDICINE
Payer: MEDICARE

## 2018-10-09 ENCOUNTER — HOSPITAL ENCOUNTER (OUTPATIENT)
Dept: GENERAL RADIOLOGY | Facility: OTHER | Age: 73
Discharge: HOME | End: 2018-10-09
Attending: FAMILY MEDICINE | Admitting: FAMILY MEDICINE
Payer: MEDICARE

## 2018-10-09 VITALS
BODY MASS INDEX: 31.7 KG/M2 | WEIGHT: 187.6 LBS | SYSTOLIC BLOOD PRESSURE: 138 MMHG | DIASTOLIC BLOOD PRESSURE: 66 MMHG | TEMPERATURE: 99.3 F | HEART RATE: 80 BPM

## 2018-10-09 DIAGNOSIS — S99.921A INJURY OF TOE ON RIGHT FOOT, INITIAL ENCOUNTER: ICD-10-CM

## 2018-10-09 DIAGNOSIS — S92.514A CLOSED NONDISPLACED FRACTURE OF PROXIMAL PHALANX OF LESSER TOE OF RIGHT FOOT, INITIAL ENCOUNTER: ICD-10-CM

## 2018-10-09 DIAGNOSIS — S99.921A INJURY OF TOE ON RIGHT FOOT, INITIAL ENCOUNTER: Primary | ICD-10-CM

## 2018-10-09 PROCEDURE — 99213 OFFICE O/P EST LOW 20 MIN: CPT | Performed by: FAMILY MEDICINE

## 2018-10-09 PROCEDURE — 73660 X-RAY EXAM OF TOE(S): CPT | Mod: RT

## 2018-10-09 PROCEDURE — G0463 HOSPITAL OUTPT CLINIC VISIT: HCPCS | Mod: 25

## 2018-10-09 PROCEDURE — G0463 HOSPITAL OUTPT CLINIC VISIT: HCPCS

## 2018-10-09 ASSESSMENT — PAIN SCALES - GENERAL: PAINLEVEL: MODERATE PAIN (4)

## 2018-10-09 NOTE — NURSING NOTE
Patient presents to clinic with pain in 4th toe on right foot. She hit it on a rock that she has holding her bathroom door open.  Dona Sosa ....................  10/9/2018   11:10 AM

## 2018-10-09 NOTE — MR AVS SNAPSHOT
After Visit Summary   10/9/2018    Fany Healy    MRN: [id number]           Patient Information     Date Of Birth          1945        Visit Information        Provider Department      10/9/2018 11:00 AM Cheryl Fernandes MD [facility]        Today's Diagnoses     Injury of toe on right foot, initial encounter    -  1      Care Instructions      Closed Toe Fracture  Your toe is broken (fractured). This causes local pain, swelling, and sometimes bruising. This injury usually takes about 4 to 6 weeks to heal, but can sometimes take longer. Toe injuries are often treated by taping the injured toe to the next one (miguel taping). This protects the injured toe and holds it in position.     If the toenail has been severely injured, it may fall off in 1 to 2 weeks. It takes up to 12 months for a new toenail to grow back.  Home care  Follow these guidelines when caring for yourself at home:    You may be given a cast shoe to wear to keep your toe from moving. If not, you can use a sandal or any shoe that doesn t put pressure on the injured toe until the swelling and pain go away. If using a sandal, be careful not to strike your foot against anything. Another injury could make the fracture worse. If you were given crutches, don t put full weight on the injured foot until you can do so without pain, or as directed by your healthcare provider.    Keep your foot elevated to reduce pain and swelling. When sleeping, put a pillow under the injured leg. When sitting, support the injured leg so it is above your waist. This is very important during the first 2 days (48 hours).    Put an ice pack on the injured area. Do this for 20 minutes every 1 to 2 hours the first day for pain relief. You can make an ice pack by wrapping a plastic bag of ice cubes in a thin towel. As the ice melts, be careful that any cloth or paper tape doesn t get wet. Continue using the ice pack 3 to 4 times a day  for the next 2 days. Then use the ice pack as needed to ease pain and swelling.    If buddy tape was used and it becomes wet or dirty, change it. You may replace it with paper, plastic, or cloth tape. Cloth tape and paper tapes must be kept dry.    You may use acetaminophen or ibuprofen to control pain, unless another pain medicine was prescribed. If you have chronic liver or kidney disease, talk with your healthcare provider before using these medicines. Also talk with your provider if you ve had a stomach ulcer or gastrointestinal bleeding.    You may return to sports or physical education activities after 4 weeks when you can run without pain, or as directed by your healthcare provider.  Follow-up care  Follow up with your healthcare provider in 1 week, or as advised. This is to make sure the bone is healing the way it should.  X-rays may be taken. You will be told of any new findings that may affect your care.  When to seek medical advice  Call your healthcare provider right away if any of these occur:    Pain or swelling gets worse    The cast/splint cracks    The cast and padding get wet and stays wet more than 24 hours    Bad odor from the cast/splint or wound fluid stains the cast    Tightness or pressure under the cast/splint gets worse    Toe becomes cold, blue, numb, or tingly    You can t move the toe    Signs of infection: fever, redness, warmth, swelling, or drainage from the wound or cast    Fever of 100.4 F (38 C) or higher, or as directed by your healthcare provider  Date Last Reviewed: 2/1/2017 2000-2017 [employer]. [address] [place] [address], [place] [id number]. All rights reserved. This information is not intended as a substitute for professional medical care. Always follow your healthcare professional's instructions.                Follow-ups after your visit        Future tests that were ordered for you today     Open Future Orders        Priority Expected Expires Ordered    XR Toe  Right G/E 2 Views Routine 10/9/2018 10/9/2019 10/9/2018            Who to contact     If you have questions or need follow up information about today's clinic visit or your schedule please contact [facility] directly at [phone].  Normal or non-critical lab and imaging results will be communicated to you by MyChart, letter or phone within 4 business days after the clinic has received the results. If you do not hear from us within 7 days, please contact the clinic through MyChart or phone. If you have a critical or abnormal lab result, we will notify you by phone as soon as possible.  Submit refill requests through [employer] or call your pharmacy and they will forward the refill request to us. Please allow 3 business days for your refill to be completed.          Additional Information About Your Visit        Care EveryWhere ID     This is your Care EveryWhere ID. This could be used by other organizations to access your [place] medical records  [id number]        Your Vitals Were     Pulse Temperature Breastfeeding? BMI (Body Mass Index)          80 99.3  F (37.4  C) No 31.7 kg/m2         Blood Pressure from Last 3 Encounters:   10/09/18 138/66   08/08/17 128/66   06/30/17 122/78    Weight from Last 3 Encounters:   10/09/18 187 lb 9.6 oz (85.1 kg)   08/08/17 190 lb 3.2 oz (86.3 kg)   06/30/17 192 lb (87.1 kg)               Primary Care Provider Fax #    Physician No Ref-Primary [phone]       No address on file        Equal Access to Services     CRISELDA HERNANDEZ : Hadii te ku hadasho Soomaali, waaxda luqadaha, qaybta kaalmada adeegyada, waxay nicholas guy . So [facility] [phone].    ATENCIÓN: Si habla español, tiene a holcomb disposición servicios gratuitos de asistencia lingüística. Llame al [phone].    We comply with applicable federal civil rights laws and [place] laws. We do not discriminate on the basis of race, color, national origin, age, disability, sex,  sexual orientation, or gender identity.            Thank you!     Thank you for choosing [facility]  for your care. Our goal is always to provide you with excellent care. Hearing back from our patients is one way we can continue to improve our services. Please take a few minutes to complete the written survey that you may receive in the mail after your visit with us. Thank you!             Your Updated Medication List - Protect others around you: Learn how to safely use, store and throw away your medicines at www.disposemymeds.org.          This list is accurate as of 10/9/18 11:38 AM.  Always use your most recent med list.                   Brand Name Dispense Instructions for use Diagnosis    ALEVE PO      Take 440 mg by mouth nightly as needed for moderate pain        amLODIPine 5 MG tablet    NORVASC     Take 1 tablet by mouth every morning        aspirin 81 MG EC tablet      Take 81 mg by mouth daily with food        biotin 1000 MCG Tabs tablet           famotidine 20 MG tablet    PEPCID     daily        fish oil-omega-3 fatty acids 1000 MG capsule           melatonin 3 MG tablet      Take 3 mg by mouth At Bedtime        METAMUCIL 28.3 % Powd   Generic drug:  psyllium      Take 1 Tablespoonful by mouth 2 times daily as needed        RA OMEPRAZOLE 20 MG tablet   Generic drug:  omeprazole      Take 20 mg by mouth daily        vitamin D 2000 units tablet      Take 2,000 Units by mouth daily

## 2018-10-09 NOTE — PROGRESS NOTES
Nursing Notes:   Dona Kruger LPN  10/9/2018 11:10 AM  Unsigned  Patient presents to clinic with pain in 4th toe on right foot. She hit it on a rock that she has holding her bathroom door open.  Dona Sosa ....................  10/9/2018   11:10 AM      SUBJECTIVE:  Fany Healy is a 72 year old female who sustained a right toe injury 2 days ago. Mechanism of injury: She inadvertently ran into a door stop that as a rock in her bathroom. Immediate symptoms: immediate pain, delayed swelling. Symptoms have been unchanged since that time. Prior history of related problems: no prior problems with this area in the past.  Social History     Social History     Marital status:      Spouse name: N/A     Number of children: N/A     Years of education: N/A     Occupational History     Not on file.     Social History Main Topics     Smoking status: Former Smoker     Quit date: 11/19/1984     Smokeless tobacco: Never Used     Alcohol use 3.0 oz/week      Comment: Alcoholic Drinks/day: Occasional glass of wine     Drug use: No      Comment: Drug use: No     Sexual activity: No     Other Topics Concern     Not on file     Social History Narrative    She is .  With  on weekends.    [place] in [place].     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             OBJECTIVE:  /66  Pulse 80  Temp 99.3  F (37.4  C)  Wt 187 lb 9.6 oz (85.1 kg)  Breastfeeding? No  BMI 31.7 kg/m2    Appearance: in no apparent distress.  Foot exam: soft tissue swelling and tenderness with bruising of the right fourth toe and no obvious deformity.      PROCEDURE:  XR TOE RIGHT G/E 2 VIEWS    HISTORY: ; Injury of toe on right foot, initial encounter    COMPARISON:  None.    TECHNIQUE:  2 views of the right toes were obtained.    FINDINGS:  There is an oblique, nondisplaced fracture of the proximal  phalanx of the fourth toe. No additional fracture or dislocation.              Impression              IMPRESSION: Nondisplaced fourth proximal phalanx toe fracture.      ESTER MARINO MD      X-rays were personally reviewed and reviewed with the patient.  ASSESSMENT:  Toe fracture    PLAN:  Patient is currently wearing sandals that are open and provide some support.  She did not want a flat bottomed shoe.  Advised that this will take about 6 weeks to heal.  Tano taping to the third digit advised.  Cheryl Fernandes MD  11:53 AM 10/9/2018   Portions of this dictation were created using the Dragon [employer] voice recognition system. Proofreading was completed but there may be errors in text.

## 2018-10-09 NOTE — PATIENT INSTRUCTIONS
Closed Toe Fracture  Your toe is broken (fractured). This causes local pain, swelling, and sometimes bruising. This injury usually takes about 4 to 6 weeks to heal, but can sometimes take longer. Toe injuries are often treated by taping the injured toe to the next one (buddy taping). This protects the injured toe and holds it in position.     If the toenail has been severely injured, it may fall off in 1 to 2 weeks. It takes up to 12 months for a new toenail to grow back.  Home care  Follow these guidelines when caring for yourself at home:    You may be given a cast shoe to wear to keep your toe from moving. If not, you can use a sandal or any shoe that doesn t put pressure on the injured toe until the swelling and pain go away. If using a sandal, be careful not to strike your foot against anything. Another injury could make the fracture worse. If you were given crutches, don t put full weight on the injured foot until you can do so without pain, or as directed by your healthcare provider.    Keep your foot elevated to reduce pain and swelling. When sleeping, put a pillow under the injured leg. When sitting, support the injured leg so it is above your waist. This is very important during the first 2 days (48 hours).    Put an ice pack on the injured area. Do this for 20 minutes every 1 to 2 hours the first day for pain relief. You can make an ice pack by wrapping a plastic bag of ice cubes in a thin towel. As the ice melts, be careful that any cloth or paper tape doesn t get wet. Continue using the ice pack 3 to 4 times a day for the next 2 days. Then use the ice pack as needed to ease pain and swelling.    If buddy tape was used and it becomes wet or dirty, change it. You may replace it with paper, plastic, or cloth tape. Cloth tape and paper tapes must be kept dry.    You may use acetaminophen or ibuprofen to control pain, unless another pain medicine was prescribed. If you have chronic liver or kidney disease,  talk with your healthcare provider before using these medicines. Also talk with your provider if you ve had a stomach ulcer or gastrointestinal bleeding.    You may return to sports or physical education activities after 4 weeks when you can run without pain, or as directed by your healthcare provider.  Follow-up care  Follow up with your healthcare provider in 1 week, or as advised. This is to make sure the bone is healing the way it should.  X-rays may be taken. You will be told of any new findings that may affect your care.  When to seek medical advice  Call your healthcare provider right away if any of these occur:    Pain or swelling gets worse    The cast/splint cracks    The cast and padding get wet and stays wet more than 24 hours    Bad odor from the cast/splint or wound fluid stains the cast    Tightness or pressure under the cast/splint gets worse    Toe becomes cold, blue, numb, or tingly    You can t move the toe    Signs of infection: fever, redness, warmth, swelling, or drainage from the wound or cast    Fever of 100.4 F (38 C) or higher, or as directed by your healthcare provider  Date Last Reviewed: 2/1/2017 2000-2017 [employer]. [address] [place] [address]. All rights reserved. This information is not intended as a substitute for professional medical care. Always follow your healthcare professional's instructions.

## 2019-06-26 ENCOUNTER — HOSPITAL ENCOUNTER (OUTPATIENT)
Dept: GENERAL RADIOLOGY | Facility: OTHER | Age: 74
Discharge: HOME | End: 2019-06-26
Attending: FAMILY MEDICINE | Admitting: FAMILY MEDICINE
Payer: MEDICARE

## 2019-06-26 ENCOUNTER — OFFICE VISIT (OUTPATIENT)
Dept: FAMILY MEDICINE | Facility: OTHER | Age: 74
End: 2019-06-26
Attending: FAMILY MEDICINE
Payer: MEDICARE

## 2019-06-26 VITALS
HEIGHT: 64 IN | HEART RATE: 64 BPM | SYSTOLIC BLOOD PRESSURE: 126 MMHG | BODY MASS INDEX: 31.65 KG/M2 | DIASTOLIC BLOOD PRESSURE: 64 MMHG | TEMPERATURE: 99.3 F | RESPIRATION RATE: 14 BRPM | WEIGHT: 185.4 LBS

## 2019-06-26 DIAGNOSIS — M25.511 ACUTE PAIN OF RIGHT SHOULDER: Primary | ICD-10-CM

## 2019-06-26 DIAGNOSIS — R19.7 DIARRHEA, UNSPECIFIED TYPE: ICD-10-CM

## 2019-06-26 DIAGNOSIS — M25.511 ACUTE PAIN OF RIGHT SHOULDER: ICD-10-CM

## 2019-06-26 LAB
C DIFF TOX B STL QL: NEGATIVE
SPECIMEN SOURCE: NORMAL

## 2019-06-26 PROCEDURE — 87209 SMEAR COMPLEX STAIN: CPT | Mod: ZL | Performed by: FAMILY MEDICINE

## 2019-06-26 PROCEDURE — 99000 SPECIMEN HANDLING OFFICE-LAB: CPT

## 2019-06-26 PROCEDURE — G0463 HOSPITAL OUTPT CLINIC VISIT: HCPCS

## 2019-06-26 PROCEDURE — 87046 STOOL CULTR AEROBIC BACT EA: CPT | Mod: ZL | Performed by: FAMILY MEDICINE

## 2019-06-26 PROCEDURE — 87045 FECES CULTURE AEROBIC BACT: CPT | Mod: ZL | Performed by: FAMILY MEDICINE

## 2019-06-26 PROCEDURE — 73030 X-RAY EXAM OF SHOULDER: CPT | Mod: RT

## 2019-06-26 PROCEDURE — 87177 OVA AND PARASITES SMEARS: CPT | Mod: ZL | Performed by: FAMILY MEDICINE

## 2019-06-26 PROCEDURE — 87493 C DIFF AMPLIFIED PROBE: CPT | Mod: ZL | Performed by: FAMILY MEDICINE

## 2019-06-26 PROCEDURE — 99214 OFFICE O/P EST MOD 30 MIN: CPT | Performed by: FAMILY MEDICINE

## 2019-06-26 PROCEDURE — G0463 HOSPITAL OUTPT CLINIC VISIT: HCPCS | Mod: 25

## 2019-06-26 ASSESSMENT — MIFFLIN-ST. JEOR: SCORE: 1320.97

## 2019-06-26 ASSESSMENT — ENCOUNTER SYMPTOMS
CONSTITUTIONAL NEGATIVE: 1
ABDOMINAL PAIN: 1
CONSTIPATION: 1
VOMITING: 0
ANAL BLEEDING: 0
DIARRHEA: 1
RECTAL PAIN: 0
HEMATOCHEZIA: 0
NAUSEA: 0

## 2019-06-26 ASSESSMENT — PAIN SCALES - GENERAL: PAINLEVEL: NO PAIN (1)

## 2019-06-26 NOTE — PATIENT INSTRUCTIONS
Patient Education     Exercises for Shoulder Flexibility: Wall Walk    Improving your flexibility can reduce pain. Stretching exercises also can help increase your range of pain-free motion. Breathe normally when you exercise. Use smooth, fluid movements.  Note: Follow any special instructions you are given. If you feel pain, stop the exercise. If the pain continues after stopping, call your healthcare provider:    Stand with your shoulder about 2 feet from the wall.    Raise your arm to shoulder level and gently  walk  your fingers up the wall as high as you can.    Hold for a few seconds. Then walk your fingers back down.    Repeat 3 times. Move closer to the wall as you repeat.    Build up to holding each stretch for 30 seconds.  Caution: Do this stretch only if your healthcare provider recommends it. Don t do it when you are first injured.  Date Last Reviewed: 3/1/2018    0671-1290 [employer]. [address] [place] [address], [place] [address]. All rights reserved. This information is not intended as a substitute for professional medical care. Always follow your healthcare professional's instructions.

## 2019-06-26 NOTE — NURSING NOTE
Patient presents to clinic with abdominal pain and diarrhea. She states that the diarrhea has slowed but is continuing to have abdominal pain while sitting. It gets better when she stands. She did bring a stool sample with her today. Patient also has right shoulder pain since falling in May 2019.  Dona Sosa ....................  6/26/2019   12:21 PM

## 2019-06-26 NOTE — LETTER
July 1, 2019      Fany Healy  [address] [address] [address]        Dear ,    Here are results of stool testing and ALL are NORMAL.  No evidence of infection as cause of diarrhea. Follow up if getting worse.     Resulted Orders   Stool culture   Result Value Ref Range    Specimen Description Feces     Shiga-Toxins 1&2       Shiga toxin 1 NOT detected and Shiga toxin 2 NOT detected.    Shiga-Toxins 1&2 No Campylobacter detected     Culture Micro       No Salmonella, Shigella, E. coli O157, Aeromonas or Plesiomonas isolated   Clostridium difficile Toxin B PCR   Result Value Ref Range    Specimen Description Feces     C Diff Toxin B PCR Negative NEG^Negative      Comment:      Negative: Clostridium difficile target DNA sequences NOT detected, presumed   negative for Clostridium difficile toxin B or the number of bacteria present   may be below the limit of detection for the test.  FDA approved assay performed using [employer] GeneXpert real-time PCR.  A negative result does not exclude actual disease due to Clostridium difficile   and may be due to improper collection, handling and storage of the specimen   or the number of organisms in the specimen is below the detection limit of the   assay.     Ova and Parasite Exam Routine   Result Value Ref Range    Specimen Description Feces     Ova and Parasite Exam Routine parasitology exam negative     Ova and Parasite Exam       Cryptosporidium, Cyclospora, and Microsporidia are not readily detected by this method. A   single negative specimen does not rule out parasitic infection.         If you have any questions or concerns, please call the clinic at the number listed above.       Sincerely,        Cheryl Fernandes MD

## 2019-06-27 LAB
O+P STL MICRO: NORMAL
O+P STL MICRO: NORMAL
SPECIMEN SOURCE: NORMAL

## 2019-06-28 LAB
BACTERIA SPEC CULT: NORMAL
E COLI SXT1+2 STL IA: NORMAL
E COLI SXT1+2 STL IA: NORMAL
SPECIMEN SOURCE: NORMAL

## 2020-05-18 ENCOUNTER — OFFICE VISIT (OUTPATIENT)
Dept: FAMILY MEDICINE | Facility: OTHER | Age: 75
End: 2020-05-18
Attending: FAMILY MEDICINE
Payer: MEDICARE

## 2020-05-18 VITALS
SYSTOLIC BLOOD PRESSURE: 178 MMHG | RESPIRATION RATE: 20 BRPM | TEMPERATURE: 99.3 F | OXYGEN SATURATION: 97 % | WEIGHT: 174.6 LBS | BODY MASS INDEX: 29.81 KG/M2 | HEIGHT: 64 IN | HEART RATE: 76 BPM | DIASTOLIC BLOOD PRESSURE: 110 MMHG

## 2020-05-18 DIAGNOSIS — J02.9 SORE THROAT: Primary | ICD-10-CM

## 2020-05-18 DIAGNOSIS — I10 ESSENTIAL HYPERTENSION: ICD-10-CM

## 2020-05-18 PROCEDURE — 99213 OFFICE O/P EST LOW 20 MIN: CPT | Performed by: FAMILY MEDICINE

## 2020-05-18 PROCEDURE — G0463 HOSPITAL OUTPT CLINIC VISIT: HCPCS

## 2020-05-18 RX ORDER — AMOXICILLIN 250 MG
1 CAPSULE ORAL
COMMUNITY

## 2020-05-18 ASSESSMENT — ENCOUNTER SYMPTOMS
FEVER: 0
SINUS PRESSURE: 0
SINUS PAIN: 0
TROUBLE SWALLOWING: 0
CHILLS: 0
RHINORRHEA: 1
SHORTNESS OF BREATH: 0
FATIGUE: 0
SORE THROAT: 1
COUGH: 0

## 2020-05-18 ASSESSMENT — PAIN SCALES - GENERAL: PAINLEVEL: NO PAIN (0)

## 2020-05-18 ASSESSMENT — MIFFLIN-ST. JEOR: SCORE: 1266.98

## 2020-05-18 NOTE — NURSING NOTE
Patient is needing office visit today for sore throat since January.   Medication Reconciliation Complete    Juanita Rodriguez LPN  5/18/2020 11:20 AM

## 2020-05-18 NOTE — PROGRESS NOTES
"  SUBJECTIVE:   Fany Healy is a 74 year old female who presents to clinic today for the following health issues:    HPI  Sore Throat:  Symptoms have been present intermittently since January but became constant over the past two months.  Severity of sore throat waxes and wanes.  She has noticed that staying hydrated with water helps as does warm tea with honey.  She has tried warm salt water gargles but is not sure they were beneficial.  She has tried nothing else.  Her symptoms worsen throughout the day.  She thinks her voice may be more hoarse than usual but denies abdominal pain and difficulty swallowing.  She has no history of allergies.  She has a history of acid reflux and states that she woke up in the middle of the night last night with \"acid in [her] throat.\"  She took some Rolaids and drank water, which helped.  Her acid reflux symptoms were managed on Omeprazole 40 mg daily and Famotidine 20 mg at bedtime for many years; a couple of months ago, she decreased her PPI to 20 mg daily.    Hypertension:  Previously well-controlled on Amlodipine 5 mg daily.  Does not monitor her blood pressure at home.  No chest pain or headaches.    Past Medical History:   Diagnosis Date     Asymptomatic menopausal state     Menopausal, previously on HRT     Chauhan's esophagus without dysplasia     Chauhan's change and chronic esophagitis noted on esophagogastroduodenoscopy 6/05.     Concussion without loss of consciousness     7/7/2011     Esophagitis     Chronic esophagitis consistent with reflux pattern.  No Chauhan's noted on esophagogastroduodenoscopy.     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     Nontoxic single thyroid nodule     History of thyroid nodules, status post surgery     Postmenopausal bleeding     10/10/06,Postmenopausal bleeding; endometrial biopsy      Past Surgical History:   Procedure Laterality Date     ARTHROPLASTY HIP  01/06/2016    " Dr. Gurvinder Abraham  - [facility]     BUNIONECTOMY  2006     C ORAL SURGERY PROCEDURE      Six root canals.     COLONOSCOPY  2007     COLONOSCOPY  2017  f/u in 10 years     ESOPHAGOSCOPY, GASTROSCOPY, DUODENOSCOPY (EGD), COMBINED      2006,Esophagogastroduodenoscopy, no evidence of Chauhan's esophagus.     ESOPHAGOSCOPY, GASTROSCOPY, DUODENOSCOPY (EGD), COMBINED  06/10/2005     Partial thyroidectomy       Reomoval of tumor on chin  1990    [facility].     SIGMOIDOSCOPY FLEXIBLE      2002,Flexible sigmoidoscopy and barium enema.     Family History   Problem Relation Age of Onset     Other - See Comments Mother         Alzheimer's     Heart Disease Mother         Heart Disease,aortic stenosis     Osteoporosis Mother         Osteoporosis     Breast Cancer Mother         Cancer-breast     Heart Disease Father 84        Heart Disease,CHF     Other - See Comments Sister 53        Stroke,CVA     Social History     Tobacco Use     Smoking status: Former Smoker     Last attempt to quit: 1984     Years since quittin.5     Smokeless tobacco: Never Used   Substance Use Topics     Alcohol use: Yes     Alcohol/week: 5.0 standard drinks     Comment: Alcoholic Drinks/day: Occasional glass of wine     Current Outpatient Medications   Medication Sig Dispense Refill     amLODIPine (NORVASC) 5 MG tablet Take 1 tablet by mouth every morning       aspirin EC 81 MG EC tablet Take 81 mg by mouth daily with food       Cholecalciferol (VITAMIN D) 2000 UNITS tablet Take 2,000 Units by mouth daily       Cobalamin Combinations (B-12) 100-5000 MCG SUBL Place 1 tablet under the tongue       Cyanocobalamin 5000 MCG SUBL        famotidine (PEPCID) 20 MG tablet daily       fish oil-omega-3 fatty acids 1000 MG capsule        melatonin 3 MG tablet Take 3 mg by mouth At Bedtime       omeprazole (RA OMEPRAZOLE) 20 MG tablet Take 20 mg by mouth daily       psyllium (METAMUCIL) 28.3 % POWD  "Take 1 Tablespoonful by mouth 2 times daily as needed       Allergies   Allergen Reactions     Sulindac      Other reaction(s): Dizziness     Review of Systems   Constitutional: Negative for chills, fatigue and fever.   HENT: Positive for rhinorrhea and sore throat. Negative for congestion, ear pain, postnasal drip, sinus pressure, sinus pain and trouble swallowing.    Respiratory: Negative for cough and shortness of breath.    Cardiovascular: Negative for chest pain.      OBJECTIVE:     BP (!) 178/110   Pulse 76   Temp 99.3  F (37.4  C)   Resp 20   Ht 1.626 m (5' 4\")   Wt 79.2 kg (174 lb 9.6 oz)   SpO2 97%   BMI 29.97 kg/m    Body mass index is 29.97 kg/m .  Physical Exam  Constitutional:       General: She is not in acute distress.     Appearance: Normal appearance. She is not ill-appearing.   HENT:      Right Ear: Tympanic membrane normal.      Left Ear: Tympanic membrane normal.      Nose: Congestion present.      Mouth/Throat:      Mouth: Mucous membranes are moist.      Pharynx: Oropharynx is clear. No posterior oropharyngeal erythema.   Neck:      Musculoskeletal: Neck supple.   Cardiovascular:      Rate and Rhythm: Normal rate and regular rhythm.   Pulmonary:      Effort: Pulmonary effort is normal.      Breath sounds: Normal breath sounds. No wheezing, rhonchi or rales.   Lymphadenopathy:      Cervical: No cervical adenopathy.   Neurological:      Mental Status: She is alert.       ASSESSMENT/PLAN:     1. Sore throat  Low suspicion for infectious etiology given the length of her symptoms.  Encouraged hydration.  May continue tea with honey and salt water gargles as needed for relief.  Increase Omeprazole back to 40 mg daily as uncontrolled acid reflux may be contributory.    2. Essential Hypertension  BP elevated upon presentation, improved to 159/86 on recheck.  Still elevated beyond goal < 130/80.  Encouraged her to check her blood pressure as able and keep log for review on follow-up.  Continue " Amlodipine 5 mg daily.    Follow-up in 4 weeks for reassessment.    Alisa Fields DO  [facility]

## 2020-07-01 ENCOUNTER — TELEPHONE (OUTPATIENT)
Dept: SURGERY | Facility: OTHER | Age: 75
End: 2020-07-01

## 2020-07-01 ENCOUNTER — OFFICE VISIT (OUTPATIENT)
Dept: FAMILY MEDICINE | Facility: OTHER | Age: 75
End: 2020-07-01
Attending: FAMILY MEDICINE
Payer: MEDICARE

## 2020-07-01 VITALS
SYSTOLIC BLOOD PRESSURE: 130 MMHG | OXYGEN SATURATION: 96 % | BODY MASS INDEX: 29.3 KG/M2 | WEIGHT: 171.6 LBS | HEART RATE: 83 BPM | HEIGHT: 64 IN | TEMPERATURE: 99.1 F | RESPIRATION RATE: 16 BRPM | DIASTOLIC BLOOD PRESSURE: 80 MMHG

## 2020-07-01 DIAGNOSIS — J02.9 SORE THROAT: ICD-10-CM

## 2020-07-01 DIAGNOSIS — K21.9 GASTROESOPHAGEAL REFLUX DISEASE, ESOPHAGITIS PRESENCE NOT SPECIFIED: Primary | ICD-10-CM

## 2020-07-01 PROCEDURE — 99213 OFFICE O/P EST LOW 20 MIN: CPT | Performed by: FAMILY MEDICINE

## 2020-07-01 PROCEDURE — G0463 HOSPITAL OUTPT CLINIC VISIT: HCPCS

## 2020-07-01 ASSESSMENT — PAIN SCALES - GENERAL: PAINLEVEL: MODERATE PAIN (4)

## 2020-07-01 ASSESSMENT — ENCOUNTER SYMPTOMS
NAUSEA: 0
CHILLS: 0
SORE THROAT: 1
HEMATOCHEZIA: 0
FEVER: 0
VOICE CHANGE: 1
SINUS PRESSURE: 0
ANAL BLEEDING: 0
VOMITING: 0
SINUS PAIN: 0
ABDOMINAL PAIN: 0
HEARTBURN: 0

## 2020-07-01 ASSESSMENT — MIFFLIN-ST. JEOR: SCORE: 1245.43

## 2020-07-01 NOTE — PROGRESS NOTES
SUBJECTIVE:   Fany Healy is a 74 year old female who presents to clinic today for the following health issues:    HPI  Sore Throat:  Symptoms have persisted despite increasing Omeprazole to 20 mg twice daily, which has controlled her symptoms of acid reflux in the past.  She is no longer taking Pepcid.  She has not seen any significant improvement in her symptoms.  Symptoms are not worse at any particular point in the day, and she has not noticed aggravating food or drink.  She is a patient of GI through the [facility] but is hesitant to go there due to more COVID-19 cases.    Past Medical History:   Diagnosis Date     Asymptomatic menopausal state     Menopausal, previously on HRT     Chauhan's esophagus without dysplasia     Chauhan's change and chronic esophagitis noted on esophagogastroduodenoscopy 6/05.     Concussion without loss of consciousness     7/7/2011     Esophagitis     Chronic esophagitis consistent with reflux pattern.  No Chauhan's noted on esophagogastroduodenoscopy.     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     Nontoxic single thyroid nodule     History of thyroid nodules, status post surgery     Postmenopausal bleeding     10/10/06,Postmenopausal bleeding; endometrial biopsy      Past Surgical History:   Procedure Laterality Date     ARTHROPLASTY HIP  01/06/2016    Dr. Gurvinder Abraham  - [facility]     BUNIONECTOMY  03/20/2006     C ORAL SURGERY PROCEDURE      Six root canals.     COLONOSCOPY  12/14/2007     COLONOSCOPY  08/29/2017 8/29/17  f/u in 10 years     ESOPHAGOSCOPY, GASTROSCOPY, DUODENOSCOPY (EGD), COMBINED      07/20/2006,Esophagogastroduodenoscopy, no evidence of Chauhan's esophagus.     ESOPHAGOSCOPY, GASTROSCOPY, DUODENOSCOPY (EGD), COMBINED  06/10/2005     Partial thyroidectomy       Reomoval of tumor on chin  05/02/1990    [facility].     SIGMOIDOSCOPY FLEXIBLE      08/16/2002,Flexible sigmoidoscopy and  "barium enema.     Family History   Problem Relation Age of Onset     Other - See Comments Mother         Alzheimer's     Heart Disease Mother         Heart Disease,aortic stenosis     Osteoporosis Mother         Osteoporosis     Breast Cancer Mother         Cancer-breast     Heart Disease Father 84        Heart Disease,CHF     Other - See Comments Sister 53        Stroke,CVA     Social History     Tobacco Use     Smoking status: Former Smoker     Last attempt to quit: 1984     Years since quittin.6     Smokeless tobacco: Never Used   Substance Use Topics     Alcohol use: Yes     Alcohol/week: 5.0 standard drinks     Comment: Alcoholic Drinks/day: Occasional glass of wine     Current Outpatient Medications   Medication Sig Dispense Refill     amLODIPine (NORVASC) 5 MG tablet Take 1 tablet by mouth every morning       aspirin EC 81 MG EC tablet Take 81 mg by mouth daily with food       Cholecalciferol (VITAMIN D) 2000 UNITS tablet Take 2,000 Units by mouth daily       Cobalamin Combinations (B-12) 100-5000 MCG SUBL Place 1 tablet under the tongue       fish oil-omega-3 fatty acids 1000 MG capsule Take 1 g by mouth 2 times daily        melatonin 3 MG tablet Take 3 mg by mouth At Bedtime       omeprazole (RA OMEPRAZOLE) 20 MG tablet Take 20 mg by mouth 2 times daily        psyllium (METAMUCIL) 28.3 % POWD Take 1 Tablespoonful by mouth 2 times daily as needed       Allergies   Allergen Reactions     Sulindac      Other reaction(s): Dizziness       Review of Systems   Constitutional: Negative for chills and fever.   HENT: Positive for sore throat and voice change. Negative for postnasal drip, sinus pressure and sinus pain.    Gastrointestinal: Negative for abdominal pain, anal bleeding, heartburn, hematochezia, nausea and vomiting.      OBJECTIVE:     /80   Pulse 83   Temp 99.1  F (37.3  C) (Temporal)   Resp 16   Ht 1.613 m (5' 3.5\")   Wt 77.8 kg (171 lb 9.6 oz)   SpO2 96%   BMI 29.92 kg/m    Body " mass index is 29.92 kg/m .  Physical Exam  Constitutional:       General: She is not in acute distress.     Appearance: Normal appearance. She is not ill-appearing.   HENT:      Right Ear: Tympanic membrane normal.      Left Ear: Tympanic membrane normal.      Nose: Nose normal.      Mouth/Throat:      Mouth: Mucous membranes are moist.      Pharynx: Oropharynx is clear. No oropharyngeal exudate or posterior oropharyngeal erythema.   Neck:      Musculoskeletal: Neck supple. No muscular tenderness.   Cardiovascular:      Rate and Rhythm: Normal rate and regular rhythm.      Heart sounds: No murmur. No friction rub. No gallop.    Pulmonary:      Effort: Pulmonary effort is normal.      Breath sounds: Normal breath sounds. No wheezing, rhonchi or rales.   Lymphadenopathy:      Cervical: No cervical adenopathy.   Neurological:      Mental Status: She is alert.       ASSESSMENT/PLAN:     1. Gastroesophageal reflux disease, esophagitis presence not specified  2. Sore throat  Persistent sore throat, suspected secondary to acid reflux.  Symptoms have not improved despite appropriate PPI therapy.  Will evaluate further with EGD.  Follow-up with GI pending results.  - GASTROENTEROLOGY ADULT REF PROCEDURE ONLY; Future      DO ROBERT [facility] AND [facility]

## 2020-07-01 NOTE — NURSING NOTE
"Chief Complaint   Patient presents with     RECHECK     throat     Still not any better. Still hurts to swallow.    Initial /80   Pulse 83   Temp 99.1  F (37.3  C) (Temporal)   Resp 16   Ht 1.613 m (5' 3.5\")   Wt 77.8 kg (171 lb 9.6 oz)   SpO2 96%   BMI 29.92 kg/m   Estimated body mass index is 29.92 kg/m  as calculated from the following:    Height as of this encounter: 1.613 m (5' 3.5\").    Weight as of this encounter: 77.8 kg (171 lb 9.6 oz).    Medication Reconciliation: complete      Norma J. Gosselin, LPN  "

## 2020-07-01 NOTE — TELEPHONE ENCOUNTER
Patient referred by Dr. Fields for a Upper GI endoscopy ,  Diagnosis is sore throat and gastroesophageal reflux disease.  Please advise.  Thank you. Samaria East on 7/1/2020 at 1:20 PM

## 2020-07-02 ENCOUNTER — TELEPHONE (OUTPATIENT)
Dept: SURGERY | Facility: OTHER | Age: 75
End: 2020-07-02

## 2020-07-02 DIAGNOSIS — Z01.818 PRE-OP TESTING: Primary | ICD-10-CM

## 2020-07-02 NOTE — TELEPHONE ENCOUNTER
Screening Questions for the Scheduling of Screening Colonoscopies   (If Colonoscopy is diagnostic, Provider should review the chart before scheduling.)  Are you younger than 50 or older than 80?   NO   Do you take aspirin or fish oil?  YES - BOTH  (if yes, tell patient to stop 1 week prior to Colonoscopy)  Do you take warfarin (Coumadin), clopidogrel (Plavix), apixaban (Eliquis), dabigatram (Pradaxa), rivaroxaban (Xarelto) or any blood thinner? NO   Do you use oxygen at home?  NO   Do you have kidney disease? NO   Are you on dialysis? NO   Have you had a stroke or heart attack in the last year? NO   Have you had a stent in your heart or any blood vessel in the last year? NO   Have you had a transplant of any organ? NO   Have you had a colonoscopy or upper endoscopy (EGD) before? YES          When?  OVER 5 YRS   Date of scheduled EGD  08/04/2020  Provider [facility]   Pharmacy

## 2020-07-09 ENCOUNTER — OFFICE VISIT (OUTPATIENT)
Dept: FAMILY MEDICINE | Facility: OTHER | Age: 75
End: 2020-07-09
Attending: FAMILY MEDICINE
Payer: MEDICARE

## 2020-07-09 VITALS
BODY MASS INDEX: 29.16 KG/M2 | OXYGEN SATURATION: 97 % | HEART RATE: 72 BPM | RESPIRATION RATE: 16 BRPM | HEIGHT: 64 IN | WEIGHT: 170.8 LBS | TEMPERATURE: 98.9 F | SYSTOLIC BLOOD PRESSURE: 138 MMHG | DIASTOLIC BLOOD PRESSURE: 80 MMHG

## 2020-07-09 DIAGNOSIS — M85.80 OSTEOPENIA, UNSPECIFIED LOCATION: ICD-10-CM

## 2020-07-09 DIAGNOSIS — Z00.00 ENCOUNTER FOR MEDICARE ANNUAL WELLNESS EXAM: Primary | ICD-10-CM

## 2020-07-09 DIAGNOSIS — K21.9 GASTROESOPHAGEAL REFLUX DISEASE, ESOPHAGITIS PRESENCE NOT SPECIFIED: ICD-10-CM

## 2020-07-09 DIAGNOSIS — I10 ESSENTIAL HYPERTENSION: ICD-10-CM

## 2020-07-09 DIAGNOSIS — R41.3 MEMORY CHANGE: ICD-10-CM

## 2020-07-09 DIAGNOSIS — Z13.220 LIPID SCREENING: ICD-10-CM

## 2020-07-09 LAB
ALBUMIN SERPL-MCNC: 4.4 G/DL (ref 3.5–5.7)
ALP SERPL-CCNC: 55 U/L (ref 34–104)
ALT SERPL W P-5'-P-CCNC: 8 U/L (ref 7–52)
ANION GAP SERPL CALCULATED.3IONS-SCNC: 7 MMOL/L (ref 3–14)
AST SERPL W P-5'-P-CCNC: 11 U/L (ref 13–39)
BASOPHILS # BLD AUTO: 0 10E9/L (ref 0–0.2)
BASOPHILS NFR BLD AUTO: 0.5 %
BILIRUB SERPL-MCNC: 0.7 MG/DL (ref 0.3–1)
BUN SERPL-MCNC: 12 MG/DL (ref 7–25)
CALCIUM SERPL-MCNC: 9.7 MG/DL (ref 8.6–10.3)
CHLORIDE SERPL-SCNC: 102 MMOL/L (ref 98–107)
CHOLEST SERPL-MCNC: 215 MG/DL
CO2 SERPL-SCNC: 28 MMOL/L (ref 21–31)
CREAT SERPL-MCNC: 0.87 MG/DL (ref 0.6–1.2)
DIFFERENTIAL METHOD BLD: NORMAL
EOSINOPHIL # BLD AUTO: 0.1 10E9/L (ref 0–0.7)
EOSINOPHIL NFR BLD AUTO: 2.3 %
ERYTHROCYTE [DISTWIDTH] IN BLOOD BY AUTOMATED COUNT: 12.5 % (ref 10–15)
GFR SERPL CREATININE-BSD FRML MDRD: 63 ML/MIN/{1.73_M2}
GLUCOSE SERPL-MCNC: 90 MG/DL (ref 70–105)
HCT VFR BLD AUTO: 42 % (ref 35–47)
HDLC SERPL-MCNC: 78 MG/DL (ref 23–92)
HGB BLD-MCNC: 13.4 G/DL (ref 11.7–15.7)
IMM GRANULOCYTES # BLD: 0 10E9/L (ref 0–0.4)
IMM GRANULOCYTES NFR BLD: 0.2 %
LDLC SERPL CALC-MCNC: 121 MG/DL
LYMPHOCYTES # BLD AUTO: 1.8 10E9/L (ref 0.8–5.3)
LYMPHOCYTES NFR BLD AUTO: 32 %
MCH RBC QN AUTO: 28.6 PG (ref 26.5–33)
MCHC RBC AUTO-ENTMCNC: 31.9 G/DL (ref 31.5–36.5)
MCV RBC AUTO: 90 FL (ref 78–100)
MONOCYTES # BLD AUTO: 0.4 10E9/L (ref 0–1.3)
MONOCYTES NFR BLD AUTO: 6.8 %
NEUTROPHILS # BLD AUTO: 3.3 10E9/L (ref 1.6–8.3)
NEUTROPHILS NFR BLD AUTO: 58.2 %
NONHDLC SERPL-MCNC: 137 MG/DL
PLATELET # BLD AUTO: 163 10E9/L (ref 150–450)
POTASSIUM SERPL-SCNC: 4 MMOL/L (ref 3.5–5.1)
PROT SERPL-MCNC: 7.5 G/DL (ref 6.4–8.9)
RBC # BLD AUTO: 4.68 10E12/L (ref 3.8–5.2)
SODIUM SERPL-SCNC: 137 MMOL/L (ref 134–144)
TRIGL SERPL-MCNC: 78 MG/DL
TSH SERPL DL<=0.05 MIU/L-ACNC: 1.51 IU/ML (ref 0.34–5.6)
VIT B12 SERPL-MCNC: 1447 PG/ML (ref 180–914)
WBC # BLD AUTO: 5.7 10E9/L (ref 4–11)

## 2020-07-09 PROCEDURE — 36415 COLL VENOUS BLD VENIPUNCTURE: CPT | Mod: ZL | Performed by: FAMILY MEDICINE

## 2020-07-09 PROCEDURE — 82607 VITAMIN B-12: CPT | Mod: ZL | Performed by: FAMILY MEDICINE

## 2020-07-09 PROCEDURE — G0439 PPPS, SUBSEQ VISIT: HCPCS | Performed by: FAMILY MEDICINE

## 2020-07-09 PROCEDURE — G0463 HOSPITAL OUTPT CLINIC VISIT: HCPCS

## 2020-07-09 PROCEDURE — 80053 COMPREHEN METABOLIC PANEL: CPT | Mod: ZL | Performed by: FAMILY MEDICINE

## 2020-07-09 PROCEDURE — 80061 LIPID PANEL: CPT | Mod: ZL | Performed by: FAMILY MEDICINE

## 2020-07-09 PROCEDURE — 85025 COMPLETE CBC W/AUTO DIFF WBC: CPT | Mod: ZL | Performed by: FAMILY MEDICINE

## 2020-07-09 PROCEDURE — 84443 ASSAY THYROID STIM HORMONE: CPT | Mod: ZL | Performed by: FAMILY MEDICINE

## 2020-07-09 RX ORDER — AMLODIPINE BESYLATE 5 MG/1
5 TABLET ORAL EVERY MORNING
Qty: 90 TABLET | Refills: 3 | Status: SHIPPED | OUTPATIENT
Start: 2020-07-09

## 2020-07-09 RX ORDER — NICOTINE POLACRILEX 4 MG/1
20 GUM, CHEWING ORAL 2 TIMES DAILY
Qty: 180 TABLET | Refills: 1 | Status: SHIPPED | OUTPATIENT
Start: 2020-07-09

## 2020-07-09 ASSESSMENT — MIFFLIN-ST. JEOR: SCORE: 1241.8

## 2020-07-09 ASSESSMENT — PAIN SCALES - GENERAL: PAINLEVEL: NO PAIN (0)

## 2020-07-09 NOTE — PROGRESS NOTES
"SUBJECTIVE:   Fany Healy is a 74 year old female who presents for Preventive Visit.  Are you in the first 12 months of your Medicare Part B coverage?  No    Physical Health:    In general, how would you rate your overall physical health? excellent    Outside of work, how many days during the week do you exercise? 2-3 days/week    Outside of work, approximately how many minutes a day do you exercise?15-30 minutes    If you drink alcohol do you typically have >3 drinks per day or >7 drinks per week? No    Do you usually eat at least 4 servings of fruit and vegetables a day, include whole grains & fiber and avoid regularly eating high fat or \"junk\" foods? Yes    Do you have any problems taking medications regularly?  No    Do you have any side effects from medications? none    Needs assistance for the following daily activities: no assistance needed    Which of the following safety concerns are present in your home?  none identified     Hearing impairment: No    In the past 6 months, have you been bothered by leaking of urine? no    Mental Health:    In general, how would you rate your overall mental or emotional health? good  PHQ-2 Score: 0    Do you feel safe in your environment? Yes    Have you ever done Advance Care Planning? (For example, a Health Directive, POLST, or a discussion with a medical provider or your loved ones about your wishes): Yes, advance care planning is on file.    Memory Issues:  Had some difficulty finding jewelry she relocated.  Reports that she struggles to find words and remember names on a daily basis.  Has not had problems remembering the names of loved ones or close friends.  Has not put herself in danger with leaving stove on, getting lost while driving to familiar location, etc.  She has a family history of Alzheimer's dementia in her mother, who was diagnosed in her 70's.    Acid Reflux:  Well-controlled on Omeprazole.  Denies abdominal pain, nausea, and vomiting.  She is due for " refill.    Hypertension:  She does not check her blood pressure at home, as she left her monitoring device in [place].  She denies dizziness and lightheadedness.  No complications or adverse effects from medication.    Osteopenia evident on DXA scan from 2019.    Fall risk:0     Cognitive Screenin) Repeat 3 items (Leader, Season, Table)    2) Clock draw: NORMAL  3) 3 item recall: Recalls 1 object   Results: NORMAL clock, 1-2 items recalled: COGNITIVE IMPAIRMENT LESS LIKELY  Mini-CogTM Copyright S Varinder. Licensed by the author for use in [facility]; reprinted with permission (mihai@[facility]). All rights reserved.      Do you have sleep apnea, excessive snoring or daytime drowsiness?: no    Reviewed and updated as needed this visit by clinical staff  Tobacco  Allergies  Meds  Problems  Med Hx  Surg Hx  Fam Hx  Soc Hx        Reviewed and updated as needed this visit by Provider        Social History     Tobacco Use     Smoking status: Former Smoker     Last attempt to quit: 1984     Years since quittin.6     Smokeless tobacco: Never Used   Substance Use Topics     Alcohol use: Yes     Alcohol/week: 5.0 standard drinks     Comment: Alcoholic Drinks/day: Occasional glass of wine     Current providers sharing in care for this patient include:   Patient Care Team:  No Ref-Primary, Physician as PCP - General  Cheryl Fernandes MD as Assigned PCP    The following health maintenance items are reviewed in Epic and correct as of today:  Health Maintenance   Topic Date Due     DEXA  1945     ADVANCE CARE PLANNING  1945     LIPID  1988     MEDICARE ANNUAL WELLNESS VISIT  2008     INFLUENZA VACCINE (1) 2020     FALL RISK ASSESSMENT  2021     DTAP/TDAP/TD IMMUNIZATION (2 - Td) 2024     COLORECTAL CANCER SCREENING  2027     PHQ-2  Completed     PNEUMOCOCCAL IMMUNIZATION 65+ LOW/MEDIUM RISK  Completed     ZOSTER IMMUNIZATION  Completed     IPV  "IMMUNIZATION  Aged Out     MENINGITIS IMMUNIZATION  Aged Out     Lab work is in process  Mammogram Screening: Patient over age 75, has elected to continue with mammography screening.    ROS:  Constitutional, HEENT, cardiovascular, pulmonary, gi and gu systems are negative, except as otherwise noted.    OBJECTIVE:   /80   Pulse 72   Temp 98.9  F (37.2  C) (Temporal)   Resp 16   Ht 1.613 m (5' 3.5\")   Wt 77.5 kg (170 lb 12.8 oz)   SpO2 97%   BMI 29.78 kg/m   Estimated body mass index is 29.78 kg/m  as calculated from the following:    Height as of this encounter: 1.613 m (5' 3.5\").    Weight as of this encounter: 77.5 kg (170 lb 12.8 oz).  EXAM:   GENERAL APPEARANCE: healthy, alert and no distress  EYES: Eyes grossly normal to inspection, PERRL and conjunctivae and sclerae normal  HENT: ear canals and TM's normal, nose and mouth without ulcers or lesions, oropharynx clear and oral mucous membranes moist  NECK: no adenopathy, no asymmetry, masses, or scars and thyroid normal to palpation  RESP: lungs clear to auscultation - no rales, rhonchi or wheezes  CV: regular rate and rhythm, normal S1 S2, no S3 or S4, no murmur, click or rub, no peripheral edema and peripheral pulses strong  ABDOMEN: soft, nontender, no hepatosplenomegaly, no masses and bowel sounds normal  MS: no musculoskeletal defects are noted and gait is age appropriate without ataxia  NEURO: Normal strength and tone, mentation intact and speech normal  PSYCH: affect normal/bright    Diagnostic Test Results:  Labs reviewed in Epic    ASSESSMENT / PLAN:   1. Encounter for Medicare annual wellness exam  Daily ASA.  BP mildly elevated beyond goal < 130/80.  Continue current anti-hypertensive regimen without adjustment and continue to monitor.  CMP today.  Check lipid panel to reassess ASCVD risk.  Colon cancer screening current.  Breast cancer screening current.  No further cervical cancer screening indicated.  DXA from 2019 significant for " "osteopenia.  Counseled on Vitamin D and Calcium supplementation and weight-bearing exercise.  Immunizations up-to-date.  No depression.  No cognitive impairment.    2. Memory change  She is concerned regarding some recent lapses in her memory, given her family history of Alzheimer's dementia.  Cognitive impairment unlikely given screening exam today.  Will evaluate with CBC, CMP, TSH, and Vitamin B12.    - Vitamin B12  - Comprehensive Metabolic Panel  - CBC and Differential  - TSH Reflex GH    3. Essential hypertension  As above.  - Comprehensive Metabolic Panel  - amLODIPine (NORVASC) 5 MG tablet; Take 1 tablet (5 mg) by mouth every morning  Dispense: 90 tablet; Refill: 3    4. Gastroesophageal reflux disease, esophagitis presence not specified  Well-controlled on current regimen.  Continue without adjustment.  - omeprazole (RA OMEPRAZOLE) 20 MG tablet; Take 1 tablet (20 mg) by mouth 2 times daily  Dispense: 180 tablet; Refill: 1    5. Osteopenia, unspecified location  Counseled on appropriate Vitamin D and Calcium intake and weight-bearing exercise.    6. Lipid screening  - Lipid Panel    COUNSELING:  Reviewed preventive health counseling, as reflected in patient instructions       Regular exercise       Healthy diet/nutrition       Vision screening       Hearing screening       Dental care       Immunizations       Osteoporosis Prevention/Bone Health       Colon cancer screening    Estimated body mass index is 29.78 kg/m  as calculated from the following:    Height as of this encounter: 1.613 m (5' 3.5\").    Weight as of this encounter: 77.5 kg (170 lb 12.8 oz).     reports that she quit smoking about 35 years ago. She has never used smokeless tobacco.    Appropriate preventive services were discussed with this patient, including applicable screening as appropriate for cardiovascular disease, diabetes, osteopenia/osteoporosis, and glaucoma.  As appropriate for age/gender, discussed screening for colorectal " cancer, prostate cancer, breast cancer, and cervical cancer. Checklist reviewing preventive services available has been given to the patient.    Reviewed patients plan of care and provided an AVS. The Basic Care Plan (routine screening as documented in Health Maintenance) for Fany meets the Care Plan requirement. This Care Plan has been established and reviewed with the Patient.    Counseling Resources:  ATP IV Guidelines  Pooled Cohorts Equation Calculator  Breast Cancer Risk Calculator  FRAX Risk Assessment  ICSI Preventive Guidelines  Dietary Guidelines for Americans, 2010  USDA's MyPlate  ASA Prophylaxis  Lung CA Screening    DO ROBERT [facility] AND [facility]

## 2020-07-09 NOTE — PATIENT INSTRUCTIONS
Patient Education   Personalized Prevention Plan  You are due for the preventive services outlined below.  Your care team is available to assist you in scheduling these services.  If you have already completed any of these items, please share that information with your care team to update in your medical record.  Health Maintenance Due   Topic Date Due     Osteoporosis Screening  1945     Discuss Advance Care Planning  1945     Cholesterol Lab  11/11/1988     Annual Wellness Visit  11/11/2008

## 2020-07-09 NOTE — NURSING NOTE
"Chief Complaint   Patient presents with     Medicare Visit         Initial /80   Pulse 72   Temp 98.9  F (37.2  C) (Temporal)   Resp 16   Ht 1.613 m (5' 3.5\")   Wt 77.5 kg (170 lb 12.8 oz)   SpO2 97%   BMI 29.78 kg/m   Estimated body mass index is 29.78 kg/m  as calculated from the following:    Height as of this encounter: 1.613 m (5' 3.5\").    Weight as of this encounter: 77.5 kg (170 lb 12.8 oz).    Medication Reconciliation: complete      Norma J. Gosselin, LPN  "

## 2020-07-09 NOTE — H&P (VIEW-ONLY)
"SUBJECTIVE:   Fany Healy is a 74 year old female who presents for Preventive Visit.  Are you in the first 12 months of your Medicare Part B coverage?  No    Physical Health:    In general, how would you rate your overall physical health? excellent    Outside of work, how many days during the week do you exercise? 2-3 days/week    Outside of work, approximately how many minutes a day do you exercise?15-30 minutes    If you drink alcohol do you typically have >3 drinks per day or >7 drinks per week? No    Do you usually eat at least 4 servings of fruit and vegetables a day, include whole grains & fiber and avoid regularly eating high fat or \"junk\" foods? Yes    Do you have any problems taking medications regularly?  No    Do you have any side effects from medications? none    Needs assistance for the following daily activities: no assistance needed    Which of the following safety concerns are present in your home?  none identified     Hearing impairment: No    In the past 6 months, have you been bothered by leaking of urine? no    Mental Health:    In general, how would you rate your overall mental or emotional health? good  PHQ-2 Score: 0    Do you feel safe in your environment? Yes    Have you ever done Advance Care Planning? (For example, a Health Directive, POLST, or a discussion with a medical provider or your loved ones about your wishes): Yes, advance care planning is on file.    Memory Issues:  Had some difficulty finding jewelry she relocated.  Reports that she struggles to find words and remember names on a daily basis.  Has not had problems remembering the names of loved ones or close friends.  Has not put herself in danger with leaving stove on, getting lost while driving to familiar location, etc.  She has a family history of Alzheimer's dementia in her mother, who was diagnosed in her 70's.    Acid Reflux:  Well-controlled on Omeprazole.  Denies abdominal pain, nausea, and vomiting.  She is due for " refill.    Hypertension:  She does not check her blood pressure at home, as she left her monitoring device in [place].  She denies dizziness and lightheadedness.  No complications or adverse effects from medication.    Osteopenia evident on DXA scan from 2019.    Fall risk:0     Cognitive Screenin) Repeat 3 items (Leader, Season, Table)    2) Clock draw: NORMAL  3) 3 item recall: Recalls 1 object   Results: NORMAL clock, 1-2 items recalled: COGNITIVE IMPAIRMENT LESS LIKELY  Mini-CogTM Copyright S Varinder. Licensed by the author for use in [facility]; reprinted with permission (mihia@[facility]). All rights reserved.      Do you have sleep apnea, excessive snoring or daytime drowsiness?: no    Reviewed and updated as needed this visit by clinical staff  Tobacco  Allergies  Meds  Problems  Med Hx  Surg Hx  Fam Hx  Soc Hx        Reviewed and updated as needed this visit by Provider        Social History     Tobacco Use     Smoking status: Former Smoker     Last attempt to quit: 1984     Years since quittin.6     Smokeless tobacco: Never Used   Substance Use Topics     Alcohol use: Yes     Alcohol/week: 5.0 standard drinks     Comment: Alcoholic Drinks/day: Occasional glass of wine     Current providers sharing in care for this patient include:   Patient Care Team:  No Ref-Primary, Physician as PCP - General  Cheryl Fernandes MD as Assigned PCP    The following health maintenance items are reviewed in Epic and correct as of today:  Health Maintenance   Topic Date Due     DEXA  1945     ADVANCE CARE PLANNING  1945     LIPID  1988     MEDICARE ANNUAL WELLNESS VISIT  2008     INFLUENZA VACCINE (1) 2020     FALL RISK ASSESSMENT  2021     DTAP/TDAP/TD IMMUNIZATION (2 - Td) 2024     COLORECTAL CANCER SCREENING  2027     PHQ-2  Completed     PNEUMOCOCCAL IMMUNIZATION 65+ LOW/MEDIUM RISK  Completed     ZOSTER IMMUNIZATION  Completed     IPV  "IMMUNIZATION  Aged Out     MENINGITIS IMMUNIZATION  Aged Out     Lab work is in process  Mammogram Screening: Patient over age 75, has elected to continue with mammography screening.    ROS:  Constitutional, HEENT, cardiovascular, pulmonary, gi and gu systems are negative, except as otherwise noted.    OBJECTIVE:   /80   Pulse 72   Temp 98.9  F (37.2  C) (Temporal)   Resp 16   Ht 1.613 m (5' 3.5\")   Wt 77.5 kg (170 lb 12.8 oz)   SpO2 97%   BMI 29.78 kg/m   Estimated body mass index is 29.78 kg/m  as calculated from the following:    Height as of this encounter: 1.613 m (5' 3.5\").    Weight as of this encounter: 77.5 kg (170 lb 12.8 oz).  EXAM:   GENERAL APPEARANCE: healthy, alert and no distress  EYES: Eyes grossly normal to inspection, PERRL and conjunctivae and sclerae normal  HENT: ear canals and TM's normal, nose and mouth without ulcers or lesions, oropharynx clear and oral mucous membranes moist  NECK: no adenopathy, no asymmetry, masses, or scars and thyroid normal to palpation  RESP: lungs clear to auscultation - no rales, rhonchi or wheezes  CV: regular rate and rhythm, normal S1 S2, no S3 or S4, no murmur, click or rub, no peripheral edema and peripheral pulses strong  ABDOMEN: soft, nontender, no hepatosplenomegaly, no masses and bowel sounds normal  MS: no musculoskeletal defects are noted and gait is age appropriate without ataxia  NEURO: Normal strength and tone, mentation intact and speech normal  PSYCH: affect normal/bright    Diagnostic Test Results:  Labs reviewed in Epic    ASSESSMENT / PLAN:   1. Encounter for Medicare annual wellness exam  Daily ASA.  BP mildly elevated beyond goal < 130/80.  Continue current anti-hypertensive regimen without adjustment and continue to monitor.  CMP today.  Check lipid panel to reassess ASCVD risk.  Colon cancer screening current.  Breast cancer screening current.  No further cervical cancer screening indicated.  DXA from 2019 significant for " "osteopenia.  Counseled on Vitamin D and Calcium supplementation and weight-bearing exercise.  Immunizations up-to-date.  No depression.  No cognitive impairment.    2. Memory change  She is concerned regarding some recent lapses in her memory, given her family history of Alzheimer's dementia.  Cognitive impairment unlikely given screening exam today.  Will evaluate with CBC, CMP, TSH, and Vitamin B12.    - Vitamin B12  - Comprehensive Metabolic Panel  - CBC and Differential  - TSH Reflex GH    3. Essential hypertension  As above.  - Comprehensive Metabolic Panel  - amLODIPine (NORVASC) 5 MG tablet; Take 1 tablet (5 mg) by mouth every morning  Dispense: 90 tablet; Refill: 3    4. Gastroesophageal reflux disease, esophagitis presence not specified  Well-controlled on current regimen.  Continue without adjustment.  - omeprazole (RA OMEPRAZOLE) 20 MG tablet; Take 1 tablet (20 mg) by mouth 2 times daily  Dispense: 180 tablet; Refill: 1    5. Osteopenia, unspecified location  Counseled on appropriate Vitamin D and Calcium intake and weight-bearing exercise.    6. Lipid screening  - Lipid Panel    COUNSELING:  Reviewed preventive health counseling, as reflected in patient instructions       Regular exercise       Healthy diet/nutrition       Vision screening       Hearing screening       Dental care       Immunizations       Osteoporosis Prevention/Bone Health       Colon cancer screening    Estimated body mass index is 29.78 kg/m  as calculated from the following:    Height as of this encounter: 1.613 m (5' 3.5\").    Weight as of this encounter: 77.5 kg (170 lb 12.8 oz).     reports that she quit smoking about 35 years ago. She has never used smokeless tobacco.    Appropriate preventive services were discussed with this patient, including applicable screening as appropriate for cardiovascular disease, diabetes, osteopenia/osteoporosis, and glaucoma.  As appropriate for age/gender, discussed screening for colorectal " cancer, prostate cancer, breast cancer, and cervical cancer. Checklist reviewing preventive services available has been given to the patient.    Reviewed patients plan of care and provided an AVS. The Basic Care Plan (routine screening as documented in Health Maintenance) for Fany meets the Care Plan requirement. This Care Plan has been established and reviewed with the Patient.    Counseling Resources:  ATP IV Guidelines  Pooled Cohorts Equation Calculator  Breast Cancer Risk Calculator  FRAX Risk Assessment  ICSI Preventive Guidelines  Dietary Guidelines for Americans, 2010  USDA's MyPlate  ASA Prophylaxis  Lung CA Screening    DO ROBERT [facility] AND [facility]

## 2020-08-01 ENCOUNTER — ALLIED HEALTH/NURSE VISIT (OUTPATIENT)
Dept: FAMILY MEDICINE | Facility: OTHER | Age: 75
End: 2020-08-01
Attending: SURGERY
Payer: MEDICARE

## 2020-08-01 DIAGNOSIS — Z20.822 ENCOUNTER FOR LABORATORY TESTING FOR COVID-19 VIRUS: Primary | ICD-10-CM

## 2020-08-01 PROCEDURE — 99207 ZZC NO CHARGE NURSE ONLY: CPT

## 2020-08-01 PROCEDURE — C9803 HOPD COVID-19 SPEC COLLECT: HCPCS

## 2020-08-01 PROCEDURE — U0003 INFECTIOUS AGENT DETECTION BY NUCLEIC ACID (DNA OR RNA); SEVERE ACUTE RESPIRATORY SYNDROME CORONAVIRUS 2 (SARS-COV-2) (CORONAVIRUS DISEASE [COVID-19]), AMPLIFIED PROBE TECHNIQUE, MAKING USE OF HIGH THROUGHPUT TECHNOLOGIES AS DESCRIBED BY CMS-2020-01-R: HCPCS | Mod: ZL | Performed by: SURGERY

## 2020-08-02 LAB
LABORATORY COMMENT REPORT: NORMAL
SARS-COV-2 RNA SPEC QL NAA+PROBE: NEGATIVE
SARS-COV-2 RNA SPEC QL NAA+PROBE: NORMAL
SPECIMEN SOURCE: NORMAL
SPECIMEN SOURCE: NORMAL

## 2020-08-04 ENCOUNTER — HOSPITAL ENCOUNTER (OUTPATIENT)
Facility: OTHER | Age: 75
Discharge: HOME | End: 2020-08-04
Attending: SURGERY | Admitting: SURGERY
Payer: MEDICARE

## 2020-08-04 ENCOUNTER — ANESTHESIA (OUTPATIENT)
Dept: SURGERY | Facility: OTHER | Age: 75
End: 2020-08-04
Payer: MEDICARE

## 2020-08-04 ENCOUNTER — ANESTHESIA EVENT (OUTPATIENT)
Dept: SURGERY | Facility: OTHER | Age: 75
End: 2020-08-04
Payer: MEDICARE

## 2020-08-04 VITALS
TEMPERATURE: 97.2 F | DIASTOLIC BLOOD PRESSURE: 72 MMHG | HEART RATE: 62 BPM | WEIGHT: 170 LBS | HEIGHT: 64 IN | BODY MASS INDEX: 29.02 KG/M2 | OXYGEN SATURATION: 94 % | SYSTOLIC BLOOD PRESSURE: 133 MMHG | RESPIRATION RATE: 16 BRPM

## 2020-08-04 DIAGNOSIS — K44.9 HIATAL HERNIA: Primary | ICD-10-CM

## 2020-08-04 PROCEDURE — 43239 EGD BIOPSY SINGLE/MULTIPLE: CPT | Performed by: NURSE ANESTHETIST, CERTIFIED REGISTERED

## 2020-08-04 PROCEDURE — 88305 TISSUE EXAM BY PATHOLOGIST: CPT

## 2020-08-04 PROCEDURE — 25000128 H RX IP 250 OP 636: Performed by: NURSE ANESTHETIST, CERTIFIED REGISTERED

## 2020-08-04 PROCEDURE — 25800030 ZZH RX IP 258 OP 636: Performed by: SURGERY

## 2020-08-04 PROCEDURE — 43239 EGD BIOPSY SINGLE/MULTIPLE: CPT | Performed by: SURGERY

## 2020-08-04 PROCEDURE — 99100 ANES PT EXTEME AGE<1 YR&>70: CPT | Performed by: NURSE ANESTHETIST, CERTIFIED REGISTERED

## 2020-08-04 PROCEDURE — 25000125 ZZHC RX 250: Performed by: SURGERY

## 2020-08-04 PROCEDURE — 25000132 ZZH RX MED GY IP 250 OP 250 PS 637: Mod: GY | Performed by: SURGERY

## 2020-08-04 PROCEDURE — 25000125 ZZHC RX 250: Performed by: NURSE ANESTHETIST, CERTIFIED REGISTERED

## 2020-08-04 PROCEDURE — 40000010 ZZH STATISTIC ANES STAT CODE-CRNA PER MINUTE: Performed by: SURGERY

## 2020-08-04 RX ORDER — PROPOFOL 10 MG/ML
INJECTION, EMULSION INTRAVENOUS CONTINUOUS PRN
Status: DISCONTINUED | OUTPATIENT
Start: 2020-08-04 | End: 2020-08-04

## 2020-08-04 RX ORDER — NALOXONE HYDROCHLORIDE 0.4 MG/ML
.1-.4 INJECTION, SOLUTION INTRAMUSCULAR; INTRAVENOUS; SUBCUTANEOUS
Status: DISCONTINUED | OUTPATIENT
Start: 2020-08-04 | End: 2020-08-04 | Stop reason: HOSPADM

## 2020-08-04 RX ORDER — LIDOCAINE HYDROCHLORIDE 20 MG/ML
INJECTION, SOLUTION INFILTRATION; PERINEURAL PRN
Status: DISCONTINUED | OUTPATIENT
Start: 2020-08-04 | End: 2020-08-04

## 2020-08-04 RX ORDER — FLUMAZENIL 0.1 MG/ML
0.2 INJECTION, SOLUTION INTRAVENOUS
Status: DISCONTINUED | OUTPATIENT
Start: 2020-08-04 | End: 2020-08-04 | Stop reason: HOSPADM

## 2020-08-04 RX ORDER — SIMETHICONE
LIQUID (ML) MISCELLANEOUS PRN
Status: DISCONTINUED | OUTPATIENT
Start: 2020-08-04 | End: 2020-08-04 | Stop reason: HOSPADM

## 2020-08-04 RX ORDER — PROPOFOL 10 MG/ML
INJECTION, EMULSION INTRAVENOUS PRN
Status: DISCONTINUED | OUTPATIENT
Start: 2020-08-04 | End: 2020-08-04

## 2020-08-04 RX ORDER — SODIUM CHLORIDE, SODIUM LACTATE, POTASSIUM CHLORIDE, CALCIUM CHLORIDE 600; 310; 30; 20 MG/100ML; MG/100ML; MG/100ML; MG/100ML
INJECTION, SOLUTION INTRAVENOUS CONTINUOUS
Status: DISCONTINUED | OUTPATIENT
Start: 2020-08-04 | End: 2020-08-04 | Stop reason: HOSPADM

## 2020-08-04 RX ORDER — LIDOCAINE 40 MG/G
CREAM TOPICAL
Status: DISCONTINUED | OUTPATIENT
Start: 2020-08-04 | End: 2020-08-04 | Stop reason: HOSPADM

## 2020-08-04 RX ADMIN — LIDOCAINE HYDROCHLORIDE 40 MG: 20 INJECTION, SOLUTION INFILTRATION; PERINEURAL at 12:55

## 2020-08-04 RX ADMIN — PROPOFOL 140 MCG/KG/MIN: 10 INJECTION, EMULSION INTRAVENOUS at 12:55

## 2020-08-04 RX ADMIN — SODIUM CHLORIDE, POTASSIUM CHLORIDE, SODIUM LACTATE AND CALCIUM CHLORIDE: 600; 310; 30; 20 INJECTION, SOLUTION INTRAVENOUS at 11:42

## 2020-08-04 RX ADMIN — PROPOFOL 40 MG: 10 INJECTION, EMULSION INTRAVENOUS at 12:55

## 2020-08-04 ASSESSMENT — MIFFLIN-ST. JEOR: SCORE: 1238.17

## 2020-08-04 NOTE — OP NOTE
PROCEDURE NOTE    DATE OF SERVICE: 8/4/2020    SURGEON: TAYLOR Maldonado MD     PRE-OP DIAGNOSIS:  GERD    POST-OP DIAGNOSIS:  GERD, hiatal hernia     PROCEDURE:   EGD with biopsy      ASSISTANT:  Circulator: Elda Cuenca RN  Relief Circulator: Marlin Quiroz RN  Scrub Person: Victor Manuel José Miguel A  Pre-Op Nurse: Manju Antoine RN  Post-Op Nurse: Bridgett Martinez RN    ANESTHESIA:  MAC                            Monitor Anesthesia CareCRNA Independent: Anat Mclaughlin APRN CRNA    INDICATION FOR THE PROCEDURE: Fany Healy is a 74 year old female. The patient presents with GERD. I explained to the patient the risks, benefits and alternatives to diagnostic EGD for evaluating GERD. We specifically discussed the risks of bleeding, infection,perforation and the risks of sedation. The patient's questions were answered and the patient wished to proceed. Informed consent paperwork was completed.    PROCEDURE:The patient was taken to the endoscopy suite. Appropriate monitors were attached. The patient was placed in the left lateral decubitus position. Bite block was positioned.Timeout was performed confirming the patient's identity and procedure to be performed. After appropriate sedation was confirmed, the flexible endoscope was advanced into the oropharynx. The posterior oropharynx appeared grossly normal. The scope was advanced into the proximal esophagus. The esophagus was insufflated with air. The scope was advanced under direct visualization. No acute abnormalities of the esophagus were noted. The scope was advanced into the stomach. The scope was advanced through the pylorus into the duodenal bulb. The bulb and distal duodenum appeared grossly normal.  The scope was withdrawn back into the stomach. Stomach appeared normalThe scope was retroflexed and the GE junction inspected, small hiatal hernia noted. The scope was returned to a neutral position and the stomach was decompressed. The scope was withdrawn  to the GE junction and biopsy obtained. 2 cm hiatal hernia again noted with sharp z line at 36cm. The mucosa of the esophagus was inspected while withdrawing the scope. No abnormalities were noted. The scope was withdrawn from the patient. The bite block was removed. The patient tolerated the procedure with no immediately apparent complication. The patient was taken to recovery instable condition.     ESTIMATED BLOOD LOSS: none    COMPLICATIONS:  None    TISSUE REMOVED:  Yes    RECOMMEND:    Continue PPi  Follow up biopsies       TAYLOR Maldonado MD

## 2020-08-04 NOTE — INTERVAL H&P NOTE
I saw and examined Fany Healy.  I have reviewed the history and physical and find no changes to the patient's medical status or condition with the exceptions noted below.   Fany Healy has long history of GERD. She reports chronic cough and voice change which may represent late stage symptoms of GERD. States she had full GERD work up in the past. Symptoms are mostly controlled with omeprazole, she still has regurgitation.   The technical details of EGD with biopsy were discussed with the patient along with the risks and benefits to include bleeding, perforation and aspiration. Fany Healy demonstrated understanding and is willing to proceed.       Michael Maldonado MD   12:45 PM 8/4/2020

## 2020-08-04 NOTE — OR NURSING
Pt denies dizziness with standing. Tolerated coffee. Reports a very mild amount of throat pain occasionally, is not bothering her at this time. AVS reviewed with pt. IV removed, cannula intact. Dr Voss reviewed case with pt before discharge. Pt's sister will be picking her up.

## 2020-08-04 NOTE — ANESTHESIA CARE TRANSFER NOTE
Patient: Fany Healy    Procedure(s):  ESOPHAGOGASTRODUODENOSCOPY, WITH BIOPSY    Diagnosis: GERD (gastroesophageal reflux disease) [K21.9]  Hoarseness [R49.0]  Diagnosis Additional Information: No value filed.    Anesthesia Type:   MAC     Note:  Airway :Room Air  Patient transferred to:Phase II  Handoff Report: Identifed the Patient, Identified the Reponsible Provider, Reviewed the pertinent medical history, Discussed the surgical course, Reviewed Intra-OP anesthesia mangement and issues during anesthesia, Set expectations for post-procedure period and Allowed opportunity for questions and acknowledgement of understanding      Vitals: (Last set prior to Anesthesia Care Transfer)    CRNA VITALS  8/4/2020 1236 - 8/4/2020 1306      8/4/2020             Pulse:  75    Ht Rate:  75    SpO2:  97 %    Resp Rate (set):  10                Electronically Signed By: ANGEL SUTHERLAND CRNA  August 4, 2020  1:06 PM

## 2020-08-04 NOTE — DISCHARGE INSTRUCTIONS
[facility]  Adult Discharge Orders & Instructions    ________________________________________________________________          For 12 hours after surgery  1. Get plenty of rest.  A responsible adult must stay with you for at least 12 hours after you leave the hospital.   2. You may feel lightheaded.  IF so, sit for a few minutes before standing.  Have someone help you get up.   3. You may have a slight fever. Call the doctor if your fever is over 101 F (38.3 C) (taken under the tongue) or lasts longer than 24 hours.  4. You may have a dry mouth, a sore throat, muscle aches or trouble sleeping.  These should go away after 24 hours.  5. Do not make important or legal decisions.  6.   Do not drive or use heavy equipment.  If you have weakness or tingling, don't drive or use heavy equipment until this feeling goes away.    To contact a doctor, call   [phone]_______________________

## 2020-08-04 NOTE — ANESTHESIA PREPROCEDURE EVALUATION
Anesthesia Pre-Procedure Evaluation    Patient: Fany Healy   MRN: [id number] : 1945          Preoperative Diagnosis: GERD (gastroesophageal reflux disease) [K21.9]  Hoarseness [R49.0]    Procedure(s):  ESOPHAGOGASTRODUODENOSCOPY (EGD)    Past Medical History:   Diagnosis Date     Asymptomatic menopausal state     Menopausal, previously on HRT     Chauhan's esophagus without dysplasia     Chauhan's change and chronic esophagitis noted on esophagogastroduodenoscopy .     Concussion without loss of consciousness     2011     Esophagitis     Chronic esophagitis consistent with reflux pattern.  No Chauhan's noted on esophagogastroduodenoscopy.     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     Nontoxic single thyroid nodule     History of thyroid nodules, status post surgery     Postmenopausal bleeding     10/10/06,Postmenopausal bleeding; endometrial biopsy     Past Surgical History:   Procedure Laterality Date     ARTHROPLASTY HIP  2016    Dr. Gurvinder Abraham  - [facility]     BUNIONECTOMY  2006     C ORAL SURGERY PROCEDURE      Six root canals.     COLONOSCOPY  2007     COLONOSCOPY  2017  f/u in 10 years     ESOPHAGOSCOPY, GASTROSCOPY, DUODENOSCOPY (EGD), COMBINED      2006,Esophagogastroduodenoscopy, no evidence of Chauhan's esophagus.     ESOPHAGOSCOPY, GASTROSCOPY, DUODENOSCOPY (EGD), COMBINED  06/10/2005     Partial thyroidectomy       Reomoval of tumor on chin  1990    [facility].     SIGMOIDOSCOPY FLEXIBLE      2002,Flexible sigmoidoscopy and barium enema.       Anesthesia Evaluation     . Pt has had prior anesthetic. Type: General and MAC    No history of anesthetic complications          ROS/MED HX    ENT/Pulmonary:  - neg pulmonary ROS     Neurologic:  - neg neurologic ROS     Cardiovascular:     (+) hypertension----. : . . . :. .       METS/Exercise Tolerance:  >4 METS  "  Hematologic:  - neg hematologic  ROS       Musculoskeletal:  - neg musculoskeletal ROS       GI/Hepatic:     (+) GERD Asymptomatic on medication,       Renal/Genitourinary:  - ROS Renal section negative       Endo:     (+) thyroid problem hypothyroidism, .      Psychiatric:  - neg psychiatric ROS       Infectious Disease:  - neg infectious disease ROS       Malignancy:      - no malignancy   Other:    - neg other ROS                      Physical Exam  Normal systems: cardiovascular, pulmonary and dental    Airway   Mallampati: II  TM distance: >3 FB  Neck ROM: full    Dental     Cardiovascular   Rhythm and rate: regular and normal      Pulmonary             Lab Results   Component Value Date    WBC 5.7 07/09/2020    HGB 13.4 07/09/2020    HCT 42.0 07/09/2020     07/09/2020     07/09/2020    POTASSIUM 4.0 07/09/2020    CHLORIDE 102 07/09/2020    CO2 28 07/09/2020    BUN 12 07/09/2020    CR 0.87 07/09/2020    GLC 90 07/09/2020    DINO 9.7 07/09/2020    ALBUMIN 4.4 07/09/2020    PROTTOTAL 7.5 07/09/2020    ALT 8 07/09/2020    AST 11 (L) 07/09/2020    ALKPHOS 55 07/09/2020    BILITOTAL 0.7 07/09/2020    T4 8.57 06/30/2017       Preop Vitals  BP Readings from Last 3 Encounters:   08/04/20 (!) 146/87   07/09/20 138/80   07/01/20 130/80    Pulse Readings from Last 3 Encounters:   08/04/20 73   07/09/20 72   07/01/20 83      Resp Readings from Last 3 Encounters:   08/04/20 16   07/09/20 16   07/01/20 16    SpO2 Readings from Last 3 Encounters:   08/04/20 96%   07/09/20 97%   07/01/20 96%      Temp Readings from Last 1 Encounters:   08/04/20 97.9  F (36.6  C) (Tympanic)    Ht Readings from Last 1 Encounters:   08/04/20 1.613 m (5' 3.5\")      Wt Readings from Last 1 Encounters:   08/04/20 77.1 kg (170 lb)    Estimated body mass index is 29.64 kg/m  as calculated from the following:    Height as of this encounter: 1.613 m (5' 3.5\").    Weight as of this encounter: 77.1 kg (170 lb).       Anesthesia " Plan      History & Physical Review      ASA Status:  2 .    NPO Status:  > 8 hours    Plan for MAC with Intravenous induction. Maintenance will be Balanced.      Risks, benefits and alternatives discussed and would like to proceed. General anesthesia ok if indicated.           Postoperative Care      Consents  Anesthetic plan, risks, benefits and alternatives discussed with:  Patient.  Use of blood products discussed: No .   .                 ANGEL Banuelos CRNA

## 2020-08-04 NOTE — ANESTHESIA POSTPROCEDURE EVALUATION
Patient: Fany Healy    Procedure(s):  ESOPHAGOGASTRODUODENOSCOPY, WITH BIOPSY    Diagnosis:GERD (gastroesophageal reflux disease) [K21.9]  Hoarseness [R49.0]  Diagnosis Additional Information: No value filed.    Anesthesia Type:  MAC    Note:  Anesthesia Post Evaluation    Patient location during evaluation: Phase 2  Patient participation: Able to fully participate in evaluation  Level of consciousness: awake and alert  Pain management: adequate  Airway patency: patent  Cardiovascular status: acceptable  Respiratory status: acceptable  Hydration status: acceptable  PONV: none             Last vitals:  Vitals:    08/04/20 1122 08/04/20 1308 08/04/20 1315   BP: (!) 146/87  120/57   Pulse: 73  63   Resp: 16     Temp: 97.9  F (36.6  C) 97  F (36.1  C)    SpO2: 96%  96%         Electronically Signed By: ANGEL SUTHERLAND CRNA  August 4, 2020  1:26 PM

## 2020-08-10 ENCOUNTER — TELEPHONE (OUTPATIENT)
Dept: SURGERY | Facility: OTHER | Age: 75
End: 2020-08-10

## 2021-03-21 DIAGNOSIS — K21.9 GASTROESOPHAGEAL REFLUX DISEASE, UNSPECIFIED WHETHER ESOPHAGITIS PRESENT: Primary | ICD-10-CM

## 2021-03-22 NOTE — TELEPHONE ENCOUNTER
"Requested Prescriptions   Pending Prescriptions Disp Refills     omeprazole (PRILOSEC) 20 MG DR capsule [Pharmacy Med Name: OMEPRAZOLE DR 20 MG CAPSULE] 180 capsule 1     Sig: TAKE 1 CAPSULE BY MOUTH TWICE A DAY       PPI Protocol Passed - 3/21/2021  3:39 PM        Passed - Not on Clopidogrel (unless Pantoprazole ordered)        Passed - No diagnosis of osteoporosis on record        Passed - Recent (12 mo) or future (30 days) visit within the authorizing provider's specialty     Patient has had an office visit with the authorizing provider or a provider within the authorizing providers department within the previous 12 mos or has a future within next 30 days. See \"Patient Info\" tab in inbasket, or \"Choose Columns\" in Meds & Orders section of the refill encounter.              Passed - Medication is active on med list        Passed - Patient is age 18 or older        Passed - No active pregnacy on record        Passed - No positive pregnancy test in past 12 months         LOV-7/9/20.  Called and spoke to patient and patient verbalized that she has moved to [facility] full time and will establish care there.  Will refill per protocol.  Patient in agreement. Prescription refilled per RN Medication RefillPolicy.................... Nae Maldonado RN ....................  3/22/2021   10:26 AM        "

## 2022-02-17 PROBLEM — M17.10 UNILATERAL PRIMARY OSTEOARTHRITIS, UNSPECIFIED KNEE: Status: ACTIVE | Noted: 2018-01-29

## (undated) DEVICE — ENDO BITE BLOCK 60 MAXI LF 00712804

## (undated) DEVICE — ENDO KIT COMPLIANCE DYKENDOCMPLY

## (undated) DEVICE — SUCTION MANIFOLD NEPTUNE 2 SYS 4 PORT 0702-020-000

## (undated) DEVICE — SOL WATER 1500ML

## (undated) DEVICE — SYR 50ML LL W/O NDL 309653

## (undated) DEVICE — TUBING SUCTION 10'X3/16" N510

## (undated) DEVICE — Device

## (undated) DEVICE — ENDO FORCEP ENDOJAW BIOPSY 2.8MMX230CM FB-220U

## (undated) RX ORDER — LIDOCAINE HYDROCHLORIDE 20 MG/ML
INJECTION, SOLUTION EPIDURAL; INFILTRATION; INTRACAUDAL; PERINEURAL
Status: DISPENSED
Start: 2020-08-04

## (undated) RX ORDER — PROPOFOL 10 MG/ML
INJECTION, EMULSION INTRAVENOUS
Status: DISPENSED
Start: 2020-08-04